# Patient Record
Sex: MALE | Race: WHITE | Employment: FULL TIME | URBAN - METROPOLITAN AREA
[De-identification: names, ages, dates, MRNs, and addresses within clinical notes are randomized per-mention and may not be internally consistent; named-entity substitution may affect disease eponyms.]

---

## 2018-02-13 ENCOUNTER — HOSPITAL ENCOUNTER (EMERGENCY)
Age: 38
Discharge: HOME | End: 2018-02-13
Attending: FAMILY MEDICINE

## 2018-02-13 ENCOUNTER — APPOINTMENT (OUTPATIENT)
Dept: GENERAL RADIOLOGY | Age: 38
End: 2018-02-13
Attending: FAMILY MEDICINE

## 2018-02-13 VITALS
SYSTOLIC BLOOD PRESSURE: 124 MMHG | RESPIRATION RATE: 16 BRPM | HEART RATE: 96 BPM | OXYGEN SATURATION: 98 % | DIASTOLIC BLOOD PRESSURE: 82 MMHG | HEIGHT: 72 IN | WEIGHT: 300 LBS | BODY MASS INDEX: 40.63 KG/M2 | TEMPERATURE: 98.4 F

## 2018-02-13 DIAGNOSIS — R68.89 FLU-LIKE SYMPTOMS: Primary | ICD-10-CM

## 2018-02-13 LAB
INFLUENZA A AG (POC): NORMAL
INFLUENZA AG (POC) NEGATIVE CTRL.: NORMAL
INFLUENZA AG (POC) POSITIVE CTRL.: NORMAL
INFLUENZA B AG (POC): NORMAL
LOT NUMBER POC: NORMAL

## 2018-02-13 RX ORDER — ALBUTEROL SULFATE 0.83 MG/ML
2.5 SOLUTION RESPIRATORY (INHALATION)
Status: COMPLETED | OUTPATIENT
Start: 2018-02-13 | End: 2018-02-13

## 2018-02-13 RX ORDER — BENZONATATE 200 MG/1
200 CAPSULE ORAL
Qty: 30 CAP | Refills: 0 | Status: SHIPPED | OUTPATIENT
Start: 2018-02-13 | End: 2018-06-23 | Stop reason: SDUPTHER

## 2018-02-13 RX ORDER — ALBUTEROL SULFATE 90 UG/1
2 AEROSOL, METERED RESPIRATORY (INHALATION)
Qty: 1 INHALER | Refills: 0 | Status: SHIPPED | OUTPATIENT
Start: 2018-02-13 | End: 2018-07-06 | Stop reason: SDUPTHER

## 2018-02-13 RX ORDER — OSELTAMIVIR PHOSPHATE 75 MG/1
75 CAPSULE ORAL 2 TIMES DAILY
Qty: 10 CAP | Refills: 0 | Status: SHIPPED | OUTPATIENT
Start: 2018-02-13 | End: 2018-02-18

## 2018-02-13 RX ADMIN — ALBUTEROL SULFATE 2.5 MG: 0.83 SOLUTION RESPIRATORY (INHALATION) at 17:14

## 2018-02-13 NOTE — DISCHARGE INSTRUCTIONS

## 2018-02-13 NOTE — LETTER
[facility] 
[address] [address] 
[phone] Work/School Note Date: 2/13/2018 To Whom It May concern: 
 
Vinh Diaz was seen and treated today in the urgent care center by the following provider(s): 
Attending Provider: Jerome Cedeño MD. Vinh Diaz may return to work on 2/19/2018. Sincerely, Jerome Cedeño MD

## 2018-02-13 NOTE — UC PROVIDER NOTE
Patient is a 40 y.o. male presenting with cold symptoms. The history is provided by the patient. Cold Symptoms    This is a new problem. The current episode started 2 days ago. The problem has not changed since onset. There has been a fever of 101 - 101.9 F. The fever has been present for less than 1 day. Associated symptoms include congestion, headaches, rhinorrhea, cough and wheezing. Pertinent negatives include no chest pain, no ear pain and no sore throat. Past Medical History:   Diagnosis Date    Depression         Past Surgical History:   Procedure Laterality Date    HX HEENT      Wisdon Teeth         Family History   Problem Relation Age of Onset    Asthma Mother     Migraines Mother     Hypertension Father     Gall Bladder Disease Brother         Social History     Social History    Marital status: UNKNOWN     Spouse name: N/A    Number of children: N/A    Years of education: N/A     Occupational History    Not on file. Social History Main Topics    Smoking status: Former Smoker    Smokeless tobacco: Never Used    Alcohol use Yes    Drug use: No    Sexual activity: Yes     Partners: Female     Other Topics Concern    Not on file     Social History Narrative                ALLERGIES: Mustard seed    Review of Systems   Constitutional: Positive for chills and fever. HENT: Positive for congestion and rhinorrhea. Negative for ear pain and sore throat. Respiratory: Positive for cough and wheezing. Negative for shortness of breath. Cardiovascular: Negative for chest pain and palpitations. Musculoskeletal: Positive for myalgias. Neurological: Positive for headaches. Vitals:    02/13/18 1624   BP: 124/82   Pulse: 96   Resp: 16   Temp: 98.4 °F (36.9 °C)   SpO2: 98%   Weight: 136.1 kg (300 lb)   Height: 6' (1.829 m)       Physical Exam   Constitutional: He appears well-developed and well-nourished. No distress.    HENT:   Right Ear: Tympanic membrane, external ear and ear canal normal.   Left Ear: Tympanic membrane, external ear and ear canal normal.   Nose: Mucosal edema and rhinorrhea present. Right sinus exhibits no maxillary sinus tenderness and no frontal sinus tenderness. Left sinus exhibits no maxillary sinus tenderness and no frontal sinus tenderness. Mouth/Throat: Mucous membranes are normal. Posterior oropharyngeal erythema present. No oropharyngeal exudate, posterior oropharyngeal edema or tonsillar abscesses. Cardiovascular: Normal rate, regular rhythm and normal heart sounds. Pulmonary/Chest: Effort normal. No respiratory distress. He has decreased breath sounds in the right lower field. He has wheezes in the right middle field and the left middle field. He has no rhonchi. He has no rales. Lymphadenopathy:     He has no cervical adenopathy. Neurological: He is alert. Skin: He is not diaphoretic. Psychiatric: He has a normal mood and affect. His behavior is normal. Judgment and thought content normal.   Nursing note and vitals reviewed. MDM     Differential Diagnosis; Clinical Impression; Plan:     CLINICAL IMPRESSION:  Flu-like symptoms  (primary encounter diagnosis)    Plan:  1. Tamiflu  2. Fluids  3. ER if worse  Amount and/or Complexity of Data Reviewed:   Clinical lab tests:  Ordered and reviewed  Tests in the radiology section of CPT®:  Ordered and reviewed  Risk of Significant Complications, Morbidity, and/or Mortality:   Presenting problems: Moderate  Diagnostic procedures: Moderate  Management options:   Moderate  Progress:   Patient progress:  Stable      Procedures

## 2018-02-15 ENCOUNTER — HOSPITAL ENCOUNTER (EMERGENCY)
Age: 38
Discharge: HOME | End: 2018-02-15
Attending: FAMILY MEDICINE

## 2018-02-15 VITALS
SYSTOLIC BLOOD PRESSURE: 128 MMHG | DIASTOLIC BLOOD PRESSURE: 85 MMHG | HEIGHT: 73 IN | WEIGHT: 293 LBS | RESPIRATION RATE: 16 BRPM | TEMPERATURE: 98.9 F | BODY MASS INDEX: 38.83 KG/M2 | OXYGEN SATURATION: 96 % | HEART RATE: 96 BPM

## 2018-02-15 DIAGNOSIS — J01.40 ACUTE NON-RECURRENT PANSINUSITIS: Primary | ICD-10-CM

## 2018-02-15 RX ORDER — DOXYCYCLINE 100 MG/1
100 CAPSULE ORAL 2 TIMES DAILY
Qty: 20 CAP | Refills: 0 | Status: SHIPPED | OUTPATIENT
Start: 2018-02-15 | End: 2018-02-25

## 2018-02-16 NOTE — UC PROVIDER NOTE
Patient is a 40 y.o. male presenting with sinus pain. The history is provided by the patient. Sinus Pain    This is a new problem. The current episode started yesterday. The problem has been gradually worsening. Patient reports a subjective fever - was not measured. The pain is at a severity of 4/10. The pain has been constant since onset. Associated symptoms include congestion, sinus pressure, swollen glands, cough and rhinorrhea. Pertinent negatives include no chills, no sweats, no sore throat, no shortness of breath and no chest pain. Treatments tried: On tamiflu for flu like symptoms. Past Medical History:   Diagnosis Date    Depression         Past Surgical History:   Procedure Laterality Date    HX HEENT      Wisdon Teeth         Family History   Problem Relation Age of Onset    Asthma Mother     Migraines Mother     Hypertension Father     Gall Bladder Disease Brother         Social History     Social History    Marital status: SINGLE     Spouse name: N/A    Number of children: N/A    Years of education: N/A     Occupational History    Not on file. Social History Main Topics    Smoking status: Former Smoker    Smokeless tobacco: Never Used    Alcohol use Yes    Drug use: No    Sexual activity: Yes     Partners: Female     Other Topics Concern    Not on file     Social History Narrative                ALLERGIES: Mustard seed    Review of Systems   Constitutional: Negative for chills and fever. HENT: Positive for congestion, rhinorrhea, sinus pain and sinus pressure. Negative for sore throat. Respiratory: Positive for cough. Negative for shortness of breath and wheezing. Cardiovascular: Negative for chest pain and palpitations. Vitals:    02/15/18 1905   BP: 128/85   Pulse: 96   Resp: 16   Temp: 98.9 °F (37.2 °C)   SpO2: 96%   Weight: 132.9 kg (293 lb)   Height: 6' 1\" (1.854 m)       Physical Exam   Constitutional: He appears well-developed and well-nourished.  No distress. HENT:   Right Ear: Tympanic membrane, external ear and ear canal normal.   Left Ear: Tympanic membrane, external ear and ear canal normal.   Nose: Rhinorrhea present. Right sinus exhibits maxillary sinus tenderness and frontal sinus tenderness. Left sinus exhibits maxillary sinus tenderness and frontal sinus tenderness. Mouth/Throat: Mucous membranes are normal. No oropharyngeal exudate, posterior oropharyngeal edema, posterior oropharyngeal erythema or tonsillar abscesses. Cardiovascular: Normal rate, regular rhythm and normal heart sounds. Pulmonary/Chest: Effort normal and breath sounds normal. No respiratory distress. He has no wheezes. He has no rales. Lymphadenopathy:     He has cervical adenopathy. Neurological: He is alert. Skin: He is not diaphoretic. Psychiatric: He has a normal mood and affect. His behavior is normal. Judgment and thought content normal.   Nursing note and vitals reviewed. MDM     Differential Diagnosis; Clinical Impression; Plan:     CLINICAL IMPRESSION:  Acute non-recurrent pansinusitis  (primary encounter diagnosis)    Plan:  1. doxycycline  2. PCP [facility]  Risk of Significant Complications, Morbidity, and/or Mortality:   Presenting problems: Moderate  Management options:   Moderate  Progress:   Patient progress:  Stable      Procedures

## 2018-02-16 NOTE — DISCHARGE INSTRUCTIONS
Sinusitis: Care Instructions  Your Care Instructions    Sinusitis is an infection of the lining of the sinus cavities in your head. Sinusitis often follows a cold. It causes pain and pressure in your head and face. In most cases, sinusitis gets better on its own in 1 to 2 weeks. But some mild symptoms may last for several weeks. Sometimes antibiotics are needed. Follow-up care is a key part of your treatment and safety. Be sure to make and go to all appointments, and call your doctor if you are having problems. It's also a good idea to know your test results and keep a list of the medicines you take. How can you care for yourself at home? · Take an over-the-counter pain medicine, such as acetaminophen (Tylenol), ibuprofen (Advil, Motrin), or naproxen (Aleve). Read and follow all instructions on the label. · If the doctor prescribed antibiotics, take them as directed. Do not stop taking them just because you feel better. You need to take the full course of antibiotics. · Be careful when taking over-the-counter cold or flu medicines and Tylenol at the same time. Many of these medicines have acetaminophen, which is Tylenol. Read the labels to make sure that you are not taking more than the recommended dose. Too much acetaminophen (Tylenol) can be harmful. · Breathe warm, moist air from a steamy shower, a hot bath, or a sink filled with hot water. Avoid cold, dry air. Using a humidifier in your home may help. Follow the directions for cleaning the machine. · Use saline (saltwater) nasal washes to help keep your nasal passages open and wash out mucus and bacteria. You can buy saline nose drops at a grocery store or drugstore. Or you can make your own at home by adding 1 teaspoon of salt and 1 teaspoon of baking soda to 2 cups of distilled water. If you make your own, fill a bulb syringe with the solution, insert the tip into your nostril, and squeeze gently. Arty Sine your nose.   · Put a hot, wet towel or a warm gel pack on your face 3 or 4 times a day for 5 to 10 minutes each time. · Try a decongestant nasal spray like oxymetazoline (Afrin). Do not use it for more than 3 days in a row. Using it for more than 3 days can make your congestion worse. When should you call for help? Call your doctor now or seek immediate medical care if:  ? · You have new or worse swelling or redness in your face or around your eyes. ? · You have a new or higher fever. ? Watch closely for changes in your health, and be sure to contact your doctor if:  ? · You have new or worse facial pain. ? · The mucus from your nose becomes thicker (like pus) or has new blood in it. ? · You are not getting better as expected. Where can you learn more? Go to http://lupe-liliam.info/. Enter R597 in the search box to learn more about \"Sinusitis: Care Instructions. \"  Current as of: May 12, 2017  Content Version: 11.4  © 3230-6473 Healthwise, Incorporated. Care instructions adapted under license by [employer] (which disclaims liability or warranty for this information). If you have questions about a medical condition or this instruction, always ask your healthcare professional. [address] any warranty or liability for your use of this information.

## 2018-06-11 ENCOUNTER — OFFICE VISIT (OUTPATIENT)
Dept: URGENT CARE | Age: 38
End: 2018-06-11

## 2018-06-11 VITALS
RESPIRATION RATE: 18 BRPM | WEIGHT: 304 LBS | HEART RATE: 94 BPM | DIASTOLIC BLOOD PRESSURE: 80 MMHG | SYSTOLIC BLOOD PRESSURE: 137 MMHG | TEMPERATURE: 98.3 F | OXYGEN SATURATION: 97 % | BODY MASS INDEX: 40.29 KG/M2 | HEIGHT: 73 IN

## 2018-06-11 DIAGNOSIS — J01.90 ACUTE SINUSITIS, RECURRENCE NOT SPECIFIED, UNSPECIFIED LOCATION: ICD-10-CM

## 2018-06-11 DIAGNOSIS — J20.9 ACUTE WHEEZY BRONCHITIS: Primary | ICD-10-CM

## 2018-06-11 PROBLEM — E66.01 OBESITY, MORBID (HCC): Status: ACTIVE | Noted: 2018-06-11

## 2018-06-11 RX ORDER — TRIAMCINOLONE ACETONIDE 55 UG/1
2 SPRAY, METERED NASAL DAILY
Qty: 1 BOTTLE | Refills: 0 | Status: SHIPPED | OUTPATIENT
Start: 2018-06-11 | End: 2018-11-24

## 2018-06-11 RX ORDER — DOXYCYCLINE 100 MG/1
100 CAPSULE ORAL 2 TIMES DAILY
Qty: 20 CAP | Refills: 0 | Status: SHIPPED | OUTPATIENT
Start: 2018-06-11 | End: 2018-06-21

## 2018-06-11 RX ORDER — PREDNISONE 20 MG/1
TABLET ORAL
Qty: 12 TAB | Refills: 0 | Status: SHIPPED | OUTPATIENT
Start: 2018-06-11 | End: 2018-07-06 | Stop reason: ALTCHOICE

## 2018-06-11 RX ORDER — ALBUTEROL SULFATE 90 UG/1
2 AEROSOL, METERED RESPIRATORY (INHALATION)
Qty: 1 INHALER | Refills: 0 | Status: SHIPPED | OUTPATIENT
Start: 2018-06-11 | End: 2018-07-06 | Stop reason: SDUPTHER

## 2018-06-11 NOTE — MR AVS SNAPSHOT
[address] [address] 
[phone] Patient: Jony Barnett MRN: [id number] [place] Visit Information Date & Time Provider Department Dept. Phone Encounter #  
 2018  8:45 AM [address] [facility] [phone] [id number] Upcoming Health Maintenance Date Due Influenza Age 5 to Adult 2018 DTaP/Tdap/Td series (2 - Td) 2022 Allergies as of 2018  Review Complete On: 2018 By: Tamiko Prater Severity Noted Reaction Type Reactions Mustard Seed  2014    Other (comments) Cotton mouth Current Immunizations  Never Reviewed Name Date TDAP Vaccine 2012 Not reviewed this visit You Were Diagnosed With   
  
 Codes Comments Acute wheezy bronchitis    -  Primary ICD-10-CM: J20.9 ICD-9-CM: 466.0 Acute sinusitis, recurrence not specified, unspecified location     ICD-10-CM: J01.90 ICD-9-CM: 461.9 Vitals BP Pulse Temp Resp Height(growth percentile) Weight(growth percentile) 137/80 94 98.3 °F (36.8 °C) 18 6' 1\" (1.854 m) 304 lb (137.9 kg) SpO2 BMI Smoking Status 97% 40.11 kg/m2 Former Smoker BMI and BSA Data Body Mass Index Body Surface Area  
 40.11 kg/m 2 2.67 m 2 Preferred Pharmacy Pharmacy Name Phone [id number] [place] [id number] [facility] [phone] Your Updated Medication List  
  
   
This list is accurate as of 18  9:10 AM.  Always use your most recent med list.  
  
  
  
  
 * albuterol 90 mcg/actuation inhaler Commonly known as:  PROVENTIL HFA, VENTOLIN HFA, PROAIR HFA Take 2 puffs by inhalation every six (6) hours as needed for Wheezing. * albuterol 90 mcg/actuation inhaler Commonly known as:  PROVENTIL HFA, VENTOLIN HFA, PROAIR HFA  
 Take 2 Puffs by inhalation every four (4) hours as needed for Wheezing. * albuterol 90 mcg/actuation inhaler Commonly known as:  PROVENTIL HFA, VENTOLIN HFA, PROAIR HFA Take 2 Puffs by inhalation every four (4) hours as needed for Wheezing. benzonatate 200 mg capsule Commonly known as:  TESSALON Take 1 Cap by mouth three (3) times daily as needed for Cough. doxycycline 100 mg capsule Commonly known as:  VIBRAMYCIN Take 1 Cap by mouth two (2) times a day for 10 days. predniSONE 20 mg tablet Commonly known as:  Alvie Craig Take 3 tabs by mouth one time daily for 2 days then take 2 tabs once daily for 3 days. Take with food. PRILOSEC PO Take  by mouth. * Notice: This list has 3 medication(s) that are the same as other medications prescribed for you. Read the directions carefully, and ask your doctor or other care provider to review them with you. Prescriptions Sent to Pharmacy Refills  
 predniSONE (DELTASONE) 20 mg tablet 0 Sig: Take 3 tabs by mouth one time daily for 2 days then take 2 tabs once daily for 3 days. Take with food. Class: Normal  
 Pharmacy: [id number] Ph #: [phone]  
 albuterol (PROVENTIL HFA, VENTOLIN HFA, PROAIR HFA) 90 mcg/actuation inhaler 0 Sig: Take 2 Puffs by inhalation every four (4) hours as needed for Wheezing. Class: Normal  
 Pharmacy: [facility] #[id number]- [address] Ph #: [phone] Route: Inhalation  
 doxycycline (VIBRAMYCIN) 100 mg capsule 0 Sig: Take 1 Cap by mouth two (2) times a day for 10 days. Class: Normal  
 Pharmacy: [facility]/pharmacy #[id number]- [address] Ph #: [phone] Route: Oral  
  
Patient Instructions Follow up with PCP without improvement over next 4-5 days sooner/immediately for new or worsening Will consider chest x ray without improvement. Bronchitis: Care Instructions Your Care Instructions Bronchitis is inflammation of the bronchial tubes, which carry air to the lungs. The tubes swell and produce mucus, or phlegm. The mucus and inflamed bronchial tubes make you cough. You may have trouble breathing. Most cases of bronchitis are caused by viruses like those that cause colds. Antibiotics usually do not help and they may be harmful. Bronchitis usually develops rapidly and lasts about 2 to 3 weeks in otherwise healthy people. Follow-up care is a key part of your treatment and safety. Be sure to make and go to all appointments, and call your doctor if you are having problems. It's also a good idea to know your test results and keep a list of the medicines you take. How can you care for yourself at home? · Take all medicines exactly as prescribed. Call your doctor if you think you are having a problem with your medicine. · Get some extra rest. 
· Take an over-the-counter pain medicine, such as acetaminophen (Tylenol), ibuprofen (Advil, Motrin), or naproxen (Aleve) to reduce fever and relieve body aches. Read and follow all instructions on the label. · Do not take two or more pain medicines at the same time unless the doctor told you to. Many pain medicines have acetaminophen, which is Tylenol. Too much acetaminophen (Tylenol) can be harmful. · Take an over-the-counter cough medicine that contains dextromethorphan to help quiet a dry, hacking cough so that you can sleep. Avoid cough medicines that have more than one active ingredient. Read and follow all instructions on the label. · Breathe moist air from a humidifier, hot shower, or sink filled with hot water. The heat and moisture will thin mucus so you can cough it out. · Do not smoke. Smoking can make bronchitis worse.  If you need help quitting, talk to your doctor about stop-smoking programs and medicines. These can increase your chances of quitting for good. When should you call for help? Call 911 anytime you think you may need emergency care. For example, call if: 
? · You have severe trouble breathing. ?Call your doctor now or seek immediate medical care if: 
? · You have new or worse trouble breathing. ? · You cough up dark brown or bloody mucus (sputum). ? · You have a new or higher fever. ? · You have a new rash. ? Watch closely for changes in your health, and be sure to contact your doctor if: 
? · You cough more deeply or more often, especially if you notice more mucus or a change in the color of your mucus. ? · You are not getting better as expected. Where can you learn more? Go to http://lupe-liliam.info/. Enter H333 in the search box to learn more about \"Bronchitis: Care Instructions. \" Current as of: May 12, 2017 Content Version: 11.4 © 5551-6381 Healthwise, Incorporated. Care instructions adapted under license by [employer] (which disclaims liability or warranty for this information). If you have questions about a medical condition or this instruction, always ask your healthcare professional. [place] [address] any warranty or liability for your use of this information. Introducing [facility] & HE[facility]! [employer] introduces Bangbite patient portal. Now you can access parts of your medical record, email your doctor's office, and request medication refills online. 1. In your internet browser, go to [email or web address] 2. Click on the First Time User? Click Here link in the Sign In box. You will see the New Member Sign Up page. 3. Enter your Bangbite Access Code exactly as it appears below. You will not need to use this code after youve completed the sign-up process.  If you do not sign up before the expiration date, you must request a new code. 
 
· Muse Access Code: [email or web address] Expires: 9/9/2018  8:43 AM 
 
4. Enter the last four digits of your Social Security Number (xxxx) and Date of Birth (mm/dd/yyyy) as indicated and click Submit. You will be taken to the next sign-up page. 5. Create a Muse ID. This will be your Muse login ID and cannot be changed, so think of one that is secure and easy to remember. 6. Create a Muse password. You can change your password at any time. 7. Enter your Password Reset Question and Answer. This can be used at a later time if you forget your password. 8. Enter your e-mail address. You will receive e-mail notification when new information is available in [address] 9. Click Sign Up. You can now view and download portions of your medical record. 10. Click the Download Summary menu link to download a portable copy of your medical information. If you have questions, please visit the Frequently Asked Questions section of the Muse website. Remember, Muse is NOT to be used for urgent needs. For medical emergencies, dial 911. Now available from your iPhone and Android! Please provide this summary of care documentation to your next provider. Your primary care clinician is listed as Bao Burciaga. If you have any questions after today's visit, please call [phone].

## 2018-06-11 NOTE — LETTER
NOTIFICATION RETURN TO WORK / SCHOOL 
 
6/11/2018 9:09 AM 
 
Mr. Traci Aponte [address] [address] To Whom It May Concern: 
 
Michel Martins is currently under the care of [address] [facility] [address]. He will return to work/school on: 06/12 or 06/13/2018 If there are questions or concerns please have the patient contact our office. Sincerely, [facility] PROVIDER Lesley Pierce NP

## 2018-06-11 NOTE — PATIENT INSTRUCTIONS
Follow up with PCP without improvement over next 4-5 days sooner/immediately for new or worsening  Will consider chest x ray without improvement. Bronchitis: Care Instructions  Your Care Instructions    Bronchitis is inflammation of the bronchial tubes, which carry air to the lungs. The tubes swell and produce mucus, or phlegm. The mucus and inflamed bronchial tubes make you cough. You may have trouble breathing. Most cases of bronchitis are caused by viruses like those that cause colds. Antibiotics usually do not help and they may be harmful. Bronchitis usually develops rapidly and lasts about 2 to 3 weeks in otherwise healthy people. Follow-up care is a key part of your treatment and safety. Be sure to make and go to all appointments, and call your doctor if you are having problems. It's also a good idea to know your test results and keep a list of the medicines you take. How can you care for yourself at home? · Take all medicines exactly as prescribed. Call your doctor if you think you are having a problem with your medicine. · Get some extra rest.  · Take an over-the-counter pain medicine, such as acetaminophen (Tylenol), ibuprofen (Advil, Motrin), or naproxen (Aleve) to reduce fever and relieve body aches. Read and follow all instructions on the label. · Do not take two or more pain medicines at the same time unless the doctor told you to. Many pain medicines have acetaminophen, which is Tylenol. Too much acetaminophen (Tylenol) can be harmful. · Take an over-the-counter cough medicine that contains dextromethorphan to help quiet a dry, hacking cough so that you can sleep. Avoid cough medicines that have more than one active ingredient. Read and follow all instructions on the label. · Breathe moist air from a humidifier, hot shower, or sink filled with hot water. The heat and moisture will thin mucus so you can cough it out. · Do not smoke. Smoking can make bronchitis worse.  If you need help quitting, talk to your doctor about stop-smoking programs and medicines. These can increase your chances of quitting for good. When should you call for help? Call 911 anytime you think you may need emergency care. For example, call if:  ? · You have severe trouble breathing. ?Call your doctor now or seek immediate medical care if:  ? · You have new or worse trouble breathing. ? · You cough up dark brown or bloody mucus (sputum). ? · You have a new or higher fever. ? · You have a new rash. ? Watch closely for changes in your health, and be sure to contact your doctor if:  ? · You cough more deeply or more often, especially if you notice more mucus or a change in the color of your mucus. ? · You are not getting better as expected. Where can you learn more? Go to http://lupe-liliam.info/. Enter H333 in the search box to learn more about \"Bronchitis: Care Instructions. \"  Current as of: May 12, 2017  Content Version: 11.4  © 4821-7109 Healthwise, Incorporated. Care instructions adapted under license by [employer] (which disclaims liability or warranty for this information). If you have questions about a medical condition or this instruction, always ask your healthcare professional. [address] any warranty or liability for your use of this information.

## 2018-06-11 NOTE — PROGRESS NOTES
HPI Comments:   Here for tight dry cough x 4-5 days. Some loose in morning. Symptoms intermittent. Some night awakening. Associated nasal congestion. Did albuterol nebs at home with some relief but then symptoms returned. Denies smoking or hx of asthma. Works around [employer] environment and occasionally has wheezy flare ups. Does have history of pneumonia post influenza this past winter. Some chills. No measured fever. Overall not improving. No known sick contacts. Patient is a 40 y.o. male presenting with cold symptoms. Cold Symptoms   Associated symptoms include wheezing. Pertinent negatives include no chest pain, no headaches, no nausea and no vomiting. Past Medical History:   Diagnosis Date    Depression         Past Surgical History:   Procedure Laterality Date    HX HEENT      Wisd[place] Teeth         Family History   Problem Relation Age of Onset    Asthma Mother     Migraines Mother     Hypertension Father     Gall Bladder Disease Brother         Social History     Social History    Marital status: SINGLE     Spouse name: N/A    Number of children: N/A    Years of education: N/A     Occupational History    Not on file. Social History Main Topics    Smoking status: Former Smoker    Smokeless tobacco: Never Used    Alcohol use Yes    Drug use: No    Sexual activity: Yes     Partners: Female     Other Topics Concern    Not on file     Social History Narrative                ALLERGIES: Mustard seed    Review of Systems   Constitutional: Negative for fatigue and fever. Respiratory: Positive for cough, chest tightness and wheezing. Cardiovascular: Negative for chest pain, palpitations and leg swelling. Gastrointestinal: Negative for nausea and vomiting. Neurological: Negative for dizziness, weakness and headaches. All other systems reviewed and are negative.       Vitals:    06/11/18 0851   BP: 137/80   Pulse: 94   Resp: 18   Temp: 98.3 °F (36.8 °C)   SpO2: 97% Weight: 304 lb (137.9 kg)   Height: 6' 1\" (1.854 m)       Physical Exam   Constitutional: He is oriented to person, place, and time. No distress. Non-toxic appearing, well hydrated   HENT:   Swollen nasal turbinates bilat. OP with post nasal drip. No swelling or exudate. Mucus membranes moist.   Eyes: Conjunctivae and EOM are normal. Pupils are equal, round, and reactive to light. No scleral icterus. Neck: Normal range of motion. Neck supple. Cardiovascular: Normal rate, regular rhythm and normal heart sounds. Exam reveals no gallop and no friction rub. No murmur heard. Pulmonary/Chest: Effort normal. No respiratory distress. He has no rales. Scattered wheeze throughout lung fields. Otherwise good air movent. Musculoskeletal:   No LE edema   Lymphadenopathy:     He has no cervical adenopathy. Neurological: He is alert and oriented to person, place, and time. No cranial nerve deficit. Skin: Skin is warm and dry. No rash noted. He is not diaphoretic. No erythema. No pallor. Psychiatric: He has a normal mood and affect. His behavior is normal. Judgment and thought content normal.   Nursing note and vitals reviewed. MDM     Differential Diagnosis; Clinical Impression; Plan:       CLINICAL IMPRESSION:  (J20.9) Acute wheezy bronchitis  (primary encounter diagnosis)  (J01.90) Acute sinusitis, recurrence not specified, unspecified location    Orders Placed This Encounter      predniSONE (DELTASONE) 20 mg tablet      albuterol (PROVENTIL HFA, VENTOLIN HFA, PROAIR HFA) 90 mcg/actuation inhaler       doxycycline (VIBRAMYCIN) 100 mg capsule      triamcinolone (NASACORT AQ) 55 mcg nasal inhaler      Plan:  1. See above orders  2. Maintain adequate fluid intake  3. Wear protective mask while at work to keep dust/irritant out  4. Review handouts  5. Consider CXR for worsening or non improvement; will hold off today. VS stable, afebrile.     We have reviewed concerning signs/symptoms, normal vs abnormal progression of medical condition and when to seek immediate medical attention. Schedule with PCP or Urgent Care immediately for worsening or new symptoms. See your PCP or return to urgent care if there is not at least some improvement in symptoms within the next 4-5 days. You should see your PCP for updates on your routine health maintenance.        Risk of Significant Complications, Morbidity, and/or Mortality:   Presenting problems:  Low  Diagnostic procedures:  Low  Management options:  Low  Progress:   Patient progress:  Stable      Procedures

## 2018-06-23 ENCOUNTER — OFFICE VISIT (OUTPATIENT)
Dept: URGENT CARE | Age: 38
End: 2018-06-23

## 2018-06-23 VITALS
WEIGHT: 306 LBS | HEART RATE: 91 BPM | TEMPERATURE: 98 F | SYSTOLIC BLOOD PRESSURE: 142 MMHG | BODY MASS INDEX: 39.27 KG/M2 | DIASTOLIC BLOOD PRESSURE: 78 MMHG | HEIGHT: 74 IN | RESPIRATION RATE: 18 BRPM | OXYGEN SATURATION: 97 %

## 2018-06-23 DIAGNOSIS — J45.41 MODERATE PERSISTENT ASTHMATIC BRONCHITIS WITH ACUTE EXACERBATION: Primary | ICD-10-CM

## 2018-06-23 RX ORDER — IBUPROFEN 800 MG/1
800 TABLET ORAL
Qty: 20 TAB | Refills: 0 | Status: SHIPPED | OUTPATIENT
Start: 2018-06-23 | End: 2018-11-24

## 2018-06-23 RX ORDER — BENZONATATE 200 MG/1
200 CAPSULE ORAL
Qty: 30 CAP | Refills: 0 | Status: SHIPPED | OUTPATIENT
Start: 2018-06-23 | End: 2018-07-06 | Stop reason: ALTCHOICE

## 2018-06-23 NOTE — MR AVS SNAPSHOT
[address] [facility] [place] [address] 
[phone] Patient: Alley Cox MRN: [id number] CJN:[id number] Visit Information Date & Time Provider Department Dept. Phone Encounter #  
 6/23/2018 11:45 AM [facility] [phone] [id number] Follow-up Instructions Return if symptoms worsen or fail to improve. Your Appointments 7/6/2018  2:30 PM  
PHYSICAL PRE OP with Veda Youssef III, DO [facility] [address] [facility]) Appt Note: NP est PCP, CPE fl 06/13/18  
 [address] [facility] [address] [id number]  
[address] Upcoming Health Maintenance Date Due Influenza Age 5 to Adult 8/1/2018 DTaP/Tdap/Td series (2 - Td) 4/26/2022 Allergies as of 6/23/2018  Review Complete On: 6/23/2018 By: Valdo Lawler Severity Noted Reaction Type Reactions Mustard Seed  08/01/2014    Other (comments) Cotton mouth Current Immunizations  Never Reviewed Name Date TDAP Vaccine 4/26/2012 Not reviewed this visit You Were Diagnosed With   
  
 Codes Comments Moderate persistent asthmatic bronchitis with acute exacerbation    -  Primary ICD-10-CM: J45.41 
ICD-9-CM: 493.92 Vitals BP Pulse Temp Resp Height(growth percentile) Weight(growth percentile) 142/78 91 98 °F (36.7 °C) 18 6' 2\" (1.88 m) 306 lb (138.8 kg) SpO2 BMI Smoking Status 97% 39.29 kg/m2 Former Smoker BMI and BSA Data Body Mass Index Body Surface Area  
 39.29 kg/m 2 2.69 m 2 Preferred Pharmacy Pharmacy Name Phone [id number] [address] [facility] [phone] Your Updated Medication List  
  
   
This list is accurate as of 6/23/18 12:18 PM.  Always use your most recent med list.  
  
  
  
  
 * albuterol 90 mcg/actuation inhaler Commonly known as:  PROVENTIL HFA, VENTOLIN HFA, PROAIR HFA Take 2 puffs by inhalation every six (6) hours as needed for Wheezing. * albuterol 90 mcg/actuation inhaler Commonly known as:  PROVENTIL HFA, VENTOLIN HFA, PROAIR HFA Take 2 Puffs by inhalation every four (4) hours as needed for Wheezing. * albuterol 90 mcg/actuation inhaler Commonly known as:  PROVENTIL HFA, VENTOLIN HFA, PROAIR HFA Take 2 Puffs by inhalation every four (4) hours as needed for Wheezing. benzonatate 200 mg capsule Commonly known as:  TESSALON Take 1 Cap by mouth three (3) times daily as needed for Cough. ibuprofen 800 mg tablet Commonly known as:  MOTRIN Take 1 Tab by mouth every eight (8) hours as needed for Pain. predniSONE 20 mg tablet Commonly known as:  Gee Baudilio Take 3 tabs by mouth one time daily for 2 days then take 2 tabs once daily for 3 days. Take with food. PRILOSEC PO Take  by mouth.  
  
 triamcinolone 55 mcg nasal inhaler Commonly known as:  NASACORT AQ  
2 Sprays by Both Nostrils route daily. * Notice: This list has 3 medication(s) that are the same as other medications prescribed for you. Read the directions carefully, and ask your doctor or other care provider to review them with you. Prescriptions Sent to Pharmacy Refills  
 benzonatate (TESSALON) 200 mg capsule 0 Sig: Take 1 Cap by mouth three (3) times daily as needed for Cough. Class: Normal  
 Pharmacy: [id number]- [address] Ph #: [phone] Route: Oral  
 ibuprofen (MOTRIN) 800 mg tablet 0 Sig: Take 1 Tab by mouth every eight (8) hours as needed for Pain. Class: Normal  
 Pharmacy: [facility]/pharmacy #[id number]- [address] Ph #: [phone] Route: Oral  
  
Follow-up Instructions Return if symptoms worsen or fail to improve. Patient Instructions Bronchitis: Care Instructions Your Care Instructions Bronchitis is inflammation of the bronchial tubes, which carry air to the lungs. The tubes swell and produce mucus, or phlegm. The mucus and inflamed bronchial tubes make you cough. You may have trouble breathing. Most cases of bronchitis are caused by viruses like those that cause colds. Antibiotics usually do not help and they may be harmful. Bronchitis usually develops rapidly and lasts about 2 to 3 weeks in otherwise healthy people. Follow-up care is a key part of your treatment and safety. Be sure to make and go to all appointments, and call your doctor if you are having problems. It's also a good idea to know your test results and keep a list of the medicines you take. How can you care for yourself at home? · Take all medicines exactly as prescribed. Call your doctor if you think you are having a problem with your medicine. · Get some extra rest. 
· Take an over-the-counter pain medicine, such as acetaminophen (Tylenol), ibuprofen (Advil, Motrin), or naproxen (Aleve) to reduce fever and relieve body aches. Read and follow all instructions on the label. · Do not take two or more pain medicines at the same time unless the doctor told you to. Many pain medicines have acetaminophen, which is Tylenol. Too much acetaminophen (Tylenol) can be harmful. · Take an over-the-counter cough medicine that contains dextromethorphan to help quiet a dry, hacking cough so that you can sleep. Avoid cough medicines that have more than one active ingredient. Read and follow all instructions on the label. · Breathe moist air from a humidifier, hot shower, or sink filled with hot water. The heat and moisture will thin mucus so you can cough it out. · Do not smoke. Smoking can make bronchitis worse.  If you need help quitting, talk to your doctor about stop-smoking programs and medicines. These can increase your chances of quitting for good. When should you call for help? Call 911 anytime you think you may need emergency care. For example, call if: 
? · You have severe trouble breathing. ?Call your doctor now or seek immediate medical care if: 
? · You have new or worse trouble breathing. ? · You cough up dark brown or bloody mucus (sputum). ? · You have a new or higher fever. ? · You have a new rash. ? Watch closely for changes in your health, and be sure to contact your doctor if: 
? · You cough more deeply or more often, especially if you notice more mucus or a change in the color of your mucus. ? · You are not getting better as expected. Where can you learn more? Go to http://lupe-liliam.info/. Enter H333 in the search box to learn more about \"Bronchitis: Care Instructions. \" Current as of: May 12, 2017 Content Version: 11.4 © 5175-9829 [employer]. Care instructions adapted under license by [employer] (which disclaims liability or warranty for this information). If you have questions about a medical condition or this instruction, always ask your healthcare professional. [address] any warranty or liability for your use of this information. Introducing [facility]! Aden Sampson introduces NextDigest patient portal. Now you can access parts of your medical record, email your doctor's office, and request medication refills online. 1. In your internet browser, go to [email or web address] 2. Click on the First Time User? Click Here link in the Sign In box. You will see the New Member Sign Up page. 3. Enter your NextDigest Access Code exactly as it appears below. You will not need to use this code after youve completed the sign-up process.  If you do not sign up before the expiration date, you must request a new code. 
 
· Outlisten Access Code: [email or web address] Expires: 9/9/2018  8:43 AM 
 
4. Enter the last four digits of your Social Security Number (xxxx) and Date of Birth (mm/dd/yyyy) as indicated and click Submit. You will be taken to the next sign-up page. 5. Create a Outlisten ID. This will be your Outlisten login ID and cannot be changed, so think of one that is secure and easy to remember. 6. Create a Outlisten password. You can change your password at any time. 7. Enter your Password Reset Question and Answer. This can be used at a later time if you forget your password. 8. Enter your e-mail address. You will receive e-mail notification when new information is available in [address] 9. Click Sign Up. You can now view and download portions of your medical record. 10. Click the Download Summary menu link to download a portable copy of your medical information. If you have questions, please visit the Frequently Asked Questions section of the Outlisten website. Remember, Outlisten is NOT to be used for urgent needs. For medical emergencies, dial 911. Now available from your iPhone and Android! Please provide this summary of care documentation to your next provider. Your primary care clinician is listed as Jennyfer Wilkins. If you have any questions after today's visit, please call [phone].

## 2018-06-23 NOTE — PROGRESS NOTES
HISTORY OF PRESENT ILLNESS  Renata Arias is a 40 y.o. male. Cough   The history is provided by the patient. This is a recurrent (seen and treated here on 6/11 for asthmatic bronchitis and sinusitis) problem. The problem has been gradually worsening (for past few days, felt better last week when had tooth pulled). Associated symptoms include chest pain (tightness) and shortness of breath. Pertinent negatives include no headaches. Exacerbated by: lying flat at night. Nothing relieves the symptoms. Treatments tried: inhaler used 3 times daily. The treatment provided mild relief. Review of Systems   Constitutional: Negative for chills and fever. HENT: Negative for congestion, ear pain and sore throat. Respiratory: Positive for cough, shortness of breath and wheezing. Negative for hemoptysis and sputum production. Cardiovascular: Positive for chest pain (tightness). Negative for leg swelling. Musculoskeletal: Negative for myalgias. Neurological: Negative for dizziness and headaches. Physical Exam   Constitutional: He is oriented to person, place, and time. He appears well-developed and well-nourished. He appears distressed. HENT:   Head: Normocephalic and atraumatic. Right Ear: External ear and ear canal normal. Tympanic membrane is injected. Tympanic membrane is not erythematous. Left Ear: External ear and ear canal normal. Tympanic membrane is injected. Tympanic membrane is not erythematous. Nose: Mucosal edema (pale and boggy) present. No rhinorrhea. Right sinus exhibits no maxillary sinus tenderness and no frontal sinus tenderness. Left sinus exhibits no maxillary sinus tenderness and no frontal sinus tenderness. Mouth/Throat: Oropharynx is clear and moist and mucous membranes are normal. No oropharyngeal exudate, posterior oropharyngeal edema or posterior oropharyngeal erythema. Eyes: Pupils are equal, round, and reactive to light. Neck: Normal range of motion. Neck supple. No thyromegaly present. Cardiovascular: Normal rate, regular rhythm, normal heart sounds and intact distal pulses. Exam reveals no gallop and no friction rub. No murmur heard. Pulmonary/Chest: Effort normal and breath sounds normal. No respiratory distress. He has no wheezes. He has no rales. Abdominal: Soft. Bowel sounds are normal. He exhibits no distension and no mass. There is no tenderness. There is no rebound. Musculoskeletal: Normal range of motion. He exhibits no edema or tenderness. Lymphadenopathy:     He has no cervical adenopathy. Neurological: He is alert and oriented to person, place, and time. Skin: Skin is warm and dry. No rash noted. He is not diaphoretic. No erythema. Psychiatric: He has a normal mood and affect. His behavior is normal. Judgment and thought content normal.   Nursing note and vitals reviewed. ASSESSMENT and PLAN  CLINICAL IMPRESSION: asthmatic bronchitis    Plan: 1. Tessalon perles  2.  mg  3. Keep appt with PCP on 7/6, may need ICS    The patients condition was discussed with the patient and they understand. The patient is to follow up with PCP. If signs and symptoms become worse the pt is to go to the ER. The patient is to take medications as prescribed. ICD-10-CM ICD-9-CM    1.  Moderate persistent asthmatic bronchitis with acute exacerbation J45.41 493.92 benzonatate (TESSALON) 200 mg capsule      ibuprofen (MOTRIN) 800 mg tablet

## 2018-06-23 NOTE — PATIENT INSTRUCTIONS
Bronchitis: Care Instructions  Your Care Instructions    Bronchitis is inflammation of the bronchial tubes, which carry air to the lungs. The tubes swell and produce mucus, or phlegm. The mucus and inflamed bronchial tubes make you cough. You may have trouble breathing. Most cases of bronchitis are caused by viruses like those that cause colds. Antibiotics usually do not help and they may be harmful. Bronchitis usually develops rapidly and lasts about 2 to 3 weeks in otherwise healthy people. Follow-up care is a key part of your treatment and safety. Be sure to make and go to all appointments, and call your doctor if you are having problems. It's also a good idea to know your test results and keep a list of the medicines you take. How can you care for yourself at home? · Take all medicines exactly as prescribed. Call your doctor if you think you are having a problem with your medicine. · Get some extra rest.  · Take an over-the-counter pain medicine, such as acetaminophen (Tylenol), ibuprofen (Advil, Motrin), or naproxen (Aleve) to reduce fever and relieve body aches. Read and follow all instructions on the label. · Do not take two or more pain medicines at the same time unless the doctor told you to. Many pain medicines have acetaminophen, which is Tylenol. Too much acetaminophen (Tylenol) can be harmful. · Take an over-the-counter cough medicine that contains dextromethorphan to help quiet a dry, hacking cough so that you can sleep. Avoid cough medicines that have more than one active ingredient. Read and follow all instructions on the label. · Breathe moist air from a humidifier, hot shower, or sink filled with hot water. The heat and moisture will thin mucus so you can cough it out. · Do not smoke. Smoking can make bronchitis worse. If you need help quitting, talk to your doctor about stop-smoking programs and medicines. These can increase your chances of quitting for good.   When should you call for help? Call 911 anytime you think you may need emergency care. For example, call if:  ? · You have severe trouble breathing. ?Call your doctor now or seek immediate medical care if:  ? · You have new or worse trouble breathing. ? · You cough up dark brown or bloody mucus (sputum). ? · You have a new or higher fever. ? · You have a new rash. ? Watch closely for changes in your health, and be sure to contact your doctor if:  ? · You cough more deeply or more often, especially if you notice more mucus or a change in the color of your mucus. ? · You are not getting better as expected. Where can you learn more? Go to http://lupe-liliam.info/. Enter H333 in the search box to learn more about \"Bronchitis: Care Instructions. \"  Current as of: May 12, 2017  Content Version: 11.4  © 2049-8350 [employer]. Care instructions adapted under license by [employer] (which disclaims liability or warranty for this information). If you have questions about a medical condition or this instruction, always ask your healthcare professional. [address] any warranty or liability for your use of this information.

## 2018-07-06 ENCOUNTER — OFFICE VISIT (OUTPATIENT)
Dept: INTERNAL MEDICINE CLINIC | Age: 38
End: 2018-07-06

## 2018-07-06 VITALS
HEART RATE: 85 BPM | HEIGHT: 74 IN | BODY MASS INDEX: 39.25 KG/M2 | WEIGHT: 305.8 LBS | SYSTOLIC BLOOD PRESSURE: 127 MMHG | DIASTOLIC BLOOD PRESSURE: 85 MMHG | OXYGEN SATURATION: 96 % | TEMPERATURE: 98.4 F | RESPIRATION RATE: 18 BRPM

## 2018-07-06 DIAGNOSIS — Z00.00 ROUTINE ADULT HEALTH MAINTENANCE: Primary | ICD-10-CM

## 2018-07-06 DIAGNOSIS — G47.33 OSA (OBSTRUCTIVE SLEEP APNEA): ICD-10-CM

## 2018-07-06 DIAGNOSIS — B37.49 CANDIDIASIS OF SCROTUM: ICD-10-CM

## 2018-07-06 RX ORDER — NYSTATIN 100000 [USP'U]/G
POWDER TOPICAL 4 TIMES DAILY
Qty: 60 G | Refills: 1 | Status: SHIPPED | OUTPATIENT
Start: 2018-07-06 | End: 2018-09-05 | Stop reason: SDUPTHER

## 2018-07-06 NOTE — MR AVS SNAPSHOT
[address] 
[phone] Patient: Edin He MRN: [id number] KFB:[id number] Visit Information Date & Time Provider Department Dept. Phone Encounter #  
 7/6/2018  2:30 PM Brigitte Fariha, [address] [place] [address] [phone] Follow-up Instructions Return in about 1 year (around 7/6/2019). Upcoming Health Maintenance Date Due Pneumococcal 19-64 Medium Risk (1 of 1 - PPSV23) 9/15/1999 Influenza Age 5 to Adult 8/1/2018 DTaP/Tdap/Td series (2 - Td) 4/26/2022 Allergies as of 7/6/2018  Review Complete On: 7/6/2018 By: Bettie Palma III,  Severity Noted Reaction Type Reactions Mustard Seed  08/01/2014    Other (comments) Cotton mouth Current Immunizations  Never Reviewed Name Date TDAP Vaccine 4/26/2012 Not reviewed this visit You Were Diagnosed With   
  
 Codes Comments Routine adult health maintenance    -  Primary ICD-10-CM: Z00.00 ICD-9-CM: V70.0   
 DIXON (obstructive sleep apnea)     ICD-10-CM: G47.33 
ICD-9-CM: 327.23 Candidiasis of scrotum     ICD-10-CM: B37.49 
ICD-9-CM: 112.2 Vitals BP Pulse Temp Resp Height(growth percentile) Weight(growth percentile) 127/85 (BP 1 Location: Left arm, BP Patient Position: Sitting) 85 98.4 °F (36.9 °C) (Oral) 18 6' 2\" (1.88 m) 305 lb 12.8 oz (138.7 kg) SpO2 BMI Smoking Status 96% 39.26 kg/m2 Former Smoker Vitals History BMI and BSA Data Body Mass Index Body Surface Area  
 39.26 kg/m 2 2.69 m 2 Preferred Pharmacy Pharmacy Name Phone [id number] [place] [address] [facility] [phone] Your Updated Medication List  
  
   
This list is accurate as of 7/6/18  3:34 PM.  Always use your most recent med list.  
  
  
  
  
 albuterol 90 mcg/actuation inhaler Commonly known as:  PROVENTIL HFA, VENTOLIN HFA, PROAIR HFA Take 2 puffs by inhalation every six (6) hours as needed for Wheezing. ibuprofen 800 mg tablet Commonly known as:  MOTRIN Take 1 Tab by mouth every eight (8) hours as needed for Pain. LUBRICATING RELIEF 0.4-0.3 % Drop Generic drug:  peg 400-propylene glycol  
as needed. nystatin powder Commonly known as:  MYCOSTATIN Apply  to affected area four (4) times daily. PRILOSEC PO Take  by mouth.  
  
 triamcinolone 55 mcg nasal inhaler Commonly known as:  NASACORT AQ  
2 Sprays by Both Nostrils route daily. Prescriptions Sent to Pharmacy Refills  
 nystatin (MYCOSTATIN) powder 1 Sig: Apply  to affected area four (4) times daily. Class: Normal  
 Pharmacy: [id number]- [address]  #: [phone] Route: Topical  
  
We Performed the Following CBC WITH AUTOMATED DIFF [83602 CPT(R)] HEMOGLOBIN A1C WITH EAG [61793 CPT(R)] HIV 1/2 AG/AB, 4TH GENERATION,W RFLX CONFIRM [id number] CPT(R)] LIPID PANEL [33614 CPT(R)] METABOLIC PANEL, COMPREHENSIVE [63792 CPT(R)] TSH 3RD GENERATION [01721 CPT(R)] Follow-up Instructions Return in about 1 year (around 7/6/2019). Patient Instructions Candidiasis: Care Instructions Your Care Instructions Candidiasis (say \"qfw-vkg-WX-uh-astrid\") is a yeast infection. Yeast normally lives in your body. But it can cause problems if your body's defenses don't work as they should. Some medicines can increase your chance of getting a yeast infection. These include antibiotics, steroids, and cancer drugs. And some diseases like AIDS and diabetes can make you more likely to get yeast infections. There are different types of yeast infections. Fuentes Samuel is a yeast infection in the mouth.  It usually occurs in people with weak immune systems. It causes white patches inside the mouth and throat. Yeast infections of the skin usually occur in skin folds where the skin stays moist. They cause red, oozing patches on your skin. Babies can get these infections under the diaper. People who often wear gloves can get them on their hands. Many women get vaginal yeast infections. They are most common when women take antibiotics. These infections can cause the vagina to itch and burn. They also cause white discharge that looks like cottage cheese. In rare cases, yeast infects the blood. This can cause serious disease. This kind of infection is treated with medicine given through a needle into a vein (IV). After you start treatment, a yeast infection usually goes away quickly. But if your immune system is weak, the infection may come back. Tell your doctor if you get yeast infections often. Follow-up care is a key part of your treatment and safety. Be sure to make and go to all appointments, and call your doctor if you are having problems. It's also a good idea to know your test results and keep a list of the medicines you take. How can you care for yourself at home? · Take your medicines exactly as prescribed. Call your doctor if you think you are having a problem with your medicine. · Use antibiotics only as directed by your doctor. · Eat yogurt with live cultures. It has bacteria called lactobacillus. It may help prevent some types of yeast infections. · Keep your skin clean and dry. Put powder on moist places. · If you are using a cream or suppository to treat a vaginal yeast infection, don't use condoms or a diaphragm. Use a different type of birth control. · Eat a healthy diet and get regular exercise. This will help keep your immune system strong. When should you call for help? Watch closely for changes in your health, and be sure to contact your doctor if: 
? · You do not get better as expected. Where can you learn more? Go to http://lupe-liliam.info/. Enter C661 in the search box to learn more about \"Candidiasis: Care Instructions. \" Current as of: October 13, 2016 Content Version: 11.4 © 7424-4953 [employer]. Care instructions adapted under license by [employer] (which disclaims liability or warranty for this information). If you have questions about a medical condition or this instruction, always ask your healthcare professional. [address] any warranty or liability for your use of this information. Learning About CPAP for Sleep Apnea What is CPAP? CPAP is a small machine that you use at home every night while you sleep. It increases air pressure in your throat to keep your airway open. When you have sleep apnea, this can help you sleep better so you feel much better. CPAP stands for \"continuous positive airway pressure. \" The CPAP machine will have one of the following: · A mask that covers your nose and mouth · Prongs that fit into your nose · A mask that covers your nose only, the most common type. This type is called NCPAP. The N stands for \"nasal.\" Why is it done? CPAP is usually the best treatment for obstructive sleep apnea. It is the first treatment choice and the most widely used. Your doctor may suggest CPAP if you have: · Moderate to severe sleep apnea. · Sleep apnea and coronary artery disease (CAD) or heart failure. How does it help? · CPAP can help you have more normal sleep, so you feel less sleepy and more alert during the daytime. · CPAP may help keep heart failure or other heart problems from getting worse. · CPAP may help lower your blood pressure. · If you use CPAP, your bed partner may also sleep better because you are not snoring or restless. What are the side effects? Some people who use CPAP have: · A dry or stuffy nose and a sore throat. · Irritated skin on the face. · Sore eyes. · Bloating. If you have any of these problems, work with your doctor to fix them. Here are some things you can try: · Be sure the mask or nasal prongs fit well. · See if your doctor can adjust the pressure of your CPAP. · If your nose is dry, try a humidifier. · If your nose is runny or stuffy, try decongestant medicine or a steroid nasal spray. Be safe with medicines. Read and follow all instructions on the label. Do not use the medicine longer than the label says. If these things do not help, you might try a different type of machine. Some machines have air pressure that adjusts on its own. Others have air pressures that are different when you breathe in than when you breathe out. This may reduce discomfort caused by too much pressure in your nose. Where can you learn more? Go to http://lupe-liliam.info/. Enter X863 in the search box to learn more about \"Learning About CPAP for Sleep Apnea. \" Current as of: May 12, 2017 Content Version: 11.4 © 2815-2327 [employer]. Care instructions adapted under license by [employer] (which disclaims liability or warranty for this information). If you have questions about a medical condition or this instruction, always ask your healthcare professional. [address] any warranty or liability for your use of this information. Introducing [facility]! Ann Marie Catherine introduces Gladitood patient portal. Now you can access parts of your medical record, email your doctor's office, and request medication refills online. 1. In your internet browser, go to [email or web address]w [employer] 2. Click on the First Time User? Click Here link in the Sign In box. You will see the New Member Sign Up page. 3. Enter your Gladitood Access Code exactly as it appears below. You will not need to use this code after youve completed the sign-up process.  If you do not sign up before the expiration date, you must request a new code. · Online Prasad Access Code: [email or web address] Expires: 9/9/2018  8:43 AM 
 
4. Enter the last four digits of your Social Security Number (xxxx) and Date of Birth (mm/dd/yyyy) as indicated and click Submit. You will be taken to the next sign-up page. 5. Create a Online Prasad ID. This will be your Online Prasad login ID and cannot be changed, so think of one that is secure and easy to remember. 6. Create a Online Prasad password. You can change your password at any time. 7. Enter your Password Reset Question and Answer. This can be used at a later time if you forget your password. 8. Enter your e-mail address. You will receive e-mail notification when new information is available in [address] 9. Click Sign Up. You can now view and download portions of your medical record. 10. Click the Download Summary menu link to download a portable copy of your medical information. If you have questions, please visit the Frequently Asked Questions section of the Online Prasad website. Remember, Online Prasad is NOT to be used for urgent needs. For medical emergencies, dial 911. Now available from your iPhone and Android! Please provide this summary of care documentation to your next provider. Your primary care clinician is listed as Cory Pickens. If you have any questions after today's visit, please call [phone].

## 2018-07-06 NOTE — PATIENT INSTRUCTIONS
Candidiasis: Care Instructions  Your Care Instructions  Candidiasis (say \"dlh-nxx-OX-uh-astrid\") is a yeast infection. Yeast normally lives in your body. But it can cause problems if your body's defenses don't work as they should. Some medicines can increase your chance of getting a yeast infection. These include antibiotics, steroids, and cancer drugs. And some diseases like AIDS and diabetes can make you more likely to get yeast infections. There are different types of yeast infections. Kassidy Veloz is a yeast infection in the mouth. It usually occurs in people with weak immune systems. It causes white patches inside the mouth and throat. Yeast infections of the skin usually occur in skin folds where the skin stays moist. They cause red, oozing patches on your skin. Babies can get these infections under the diaper. People who often wear gloves can get them on their hands. Many women get vaginal yeast infections. They are most common when women take antibiotics. These infections can cause the vagina to itch and burn. They also cause white discharge that looks like cottage cheese. In rare cases, yeast infects the blood. This can cause serious disease. This kind of infection is treated with medicine given through a needle into a vein (IV). After you start treatment, a yeast infection usually goes away quickly. But if your immune system is weak, the infection may come back. Tell your doctor if you get yeast infections often. Follow-up care is a key part of your treatment and safety. Be sure to make and go to all appointments, and call your doctor if you are having problems. It's also a good idea to know your test results and keep a list of the medicines you take. How can you care for yourself at home? · Take your medicines exactly as prescribed. Call your doctor if you think you are having a problem with your medicine. · Use antibiotics only as directed by your doctor. · Eat yogurt with live cultures.  It has bacteria called lactobacillus. It may help prevent some types of yeast infections. · Keep your skin clean and dry. Put powder on moist places. · If you are using a cream or suppository to treat a vaginal yeast infection, don't use condoms or a diaphragm. Use a different type of birth control. · Eat a healthy diet and get regular exercise. This will help keep your immune system strong. When should you call for help? Watch closely for changes in your health, and be sure to contact your doctor if:  ? · You do not get better as expected. Where can you learn more? Go to http://lupe-liliam.info/. Enter W232 in the search box to learn more about \"Candidiasis: Care Instructions. \"  Current as of: October 13, 2016  Content Version: 11.4  © 0295-4308 Cannonball. Care instructions adapted under license by [employer] (which disclaims liability or warranty for this information). If you have questions about a medical condition or this instruction, always ask your healthcare professional. [address] any warranty or liability for your use of this information. Learning About CPAP for Sleep Apnea  What is CPAP? CPAP is a small machine that you use at home every night while you sleep. It increases air pressure in your throat to keep your airway open. When you have sleep apnea, this can help you sleep better so you feel much better. CPAP stands for \"continuous positive airway pressure. \"  The CPAP machine will have one of the following:  · A mask that covers your nose and mouth  · Prongs that fit into your nose  · A mask that covers your nose only, the most common type. This type is called NCPAP. The N stands for \"nasal.\"  Why is it done? CPAP is usually the best treatment for obstructive sleep apnea. It is the first treatment choice and the most widely used. Your doctor may suggest CPAP if you have:  · Moderate to severe sleep apnea.   · Sleep apnea and coronary artery disease (CAD) or heart failure. How does it help? · CPAP can help you have more normal sleep, so you feel less sleepy and more alert during the daytime. · CPAP may help keep heart failure or other heart problems from getting worse. · CPAP may help lower your blood pressure. · If you use CPAP, your bed partner may also sleep better because you are not snoring or restless. What are the side effects? Some people who use CPAP have:  · A dry or stuffy nose and a sore throat. · Irritated skin on the face. · Sore eyes. · Bloating. If you have any of these problems, work with your doctor to fix them. Here are some things you can try:  · Be sure the mask or nasal prongs fit well. · See if your doctor can adjust the pressure of your CPAP. · If your nose is dry, try a humidifier. · If your nose is runny or stuffy, try decongestant medicine or a steroid nasal spray. Be safe with medicines. Read and follow all instructions on the label. Do not use the medicine longer than the label says. If these things do not help, you might try a different type of machine. Some machines have air pressure that adjusts on its own. Others have air pressures that are different when you breathe in than when you breathe out. This may reduce discomfort caused by too much pressure in your nose. Where can you learn more? Go to http://lupe-liliam.info/. Enter B522 in the search box to learn more about \"Learning About CPAP for Sleep Apnea. \"  Current as of: May 12, 2017  Content Version: 11.4  © 0794-4123 Spinlister. Care instructions adapted under license by [employer] (which disclaims liability or warranty for this information). If you have questions about a medical condition or this instruction, always ask your healthcare professional. [address] any warranty or liability for your use of this information.

## 2018-07-06 NOTE — PROGRESS NOTES
Reviewed record in preparation for visit and have obtained necessary documentation. Identified pt with two pt identifiers(name and ). Chief Complaint   Patient presents with    Complete Physical       Health Maintenance Due   Topic Date Due    Pneumococcal Vaccine (1 of  - PPSV23) 09/15/1999       Coordination of Care Questionnaire:  :     1) Have you been to an emergency room, urgent care clinic since your last visit?  yes Urgent Care DX: Bronchitis 2018 Hospitalized since your last visit? no             2) Have you seen or consulted any other health care providers outside of [address] [place] [address] since your last visit? no  (Include any pap smears or colon screenings in this section.)

## 2018-07-06 NOTE — PROGRESS NOTES
Nneka Putnam is a 40 y.o. male who presents for evaluation of cpe, new pt visit. No recent pcp. Girlfriend binh gunter, is also my patient. Had rough start to year with few bouts of bronchitis. Doing well now though. Works doing construction, has special breathing mask and eye protection, but still struggles with pulmonary issues at times. ROS:  Constitutional: negative for fevers, chills, anorexia and weight loss  Eyes:   negative for visual disturbance and irritation  ENT:   negative for tinnitus,sore throat,nasal congestion,ear pain,hoarseness  Respiratory:  negative for cough, hemoptysis, dyspnea,wheezing  CV:   negative for chest pain, palpitations, lower extremity edema  GI:   negative for nausea, vomiting, diarrhea, abdominal pain,melena  Genitourinary: negative for frequency, dysuria and hematuria  Musculoskel: negative for myalgias, arthralgias, back pain, muscle weakness, joint pain  Neurological:  negative for headaches, dizziness, focal weakness, numbness  Psychiatric:     Negative for depression or anxiety      Past Medical History:   Diagnosis Date    Depression        Past Surgical History:   Procedure Laterality Date    HX HEENT      Wisdon Teeth       Family History   Problem Relation Age of Onset    Asthma Mother     Migraines Mother     Hypertension Father     Cancer Father    Jimmy Fontenot Bladder Disease Brother        Social History     Social History    Marital status: SINGLE     Spouse name: N/A    Number of children: N/A    Years of education: N/A     Occupational History    Not on file.      Social History Main Topics    Smoking status: Former Smoker    Smokeless tobacco: Never Used    Alcohol use Yes    Drug use: No    Sexual activity: Yes     Partners: Female     Other Topics Concern    Not on file     Social History Narrative            Visit Vitals    /85 (BP 1 Location: Left arm, BP Patient Position: Sitting)    Pulse 85    Temp 98.4 °F (36.9 °C) (Oral)    Resp 18    Ht 6' 2\" (1.88 m)    Wt 305 lb 12.8 oz (138.7 kg)    SpO2 96%    BMI 39.26 kg/m2       Physical Examination:   General - Well appearing male  HEENT - PERRL, TM no erythema/opacification, normal nasal turbinates, no oropharyngeal erythema or exudate, MMM  Neck - supple, no bruits, no thyroidomegaly, no lymphadenopathy  Pulm - clear to auscultation bilaterally  Cardio - RRR, normal S1 S2, no murmur  Abd - soft, nontender, no masses, no HSM  Extrem - no edema, +2 distal pulses  Neuro-  No focal deficits, CN intact     Assessment/Plan:    1. Routine adult health maintenance--check cbc, cmp, flp, tsh, hiv, a1c  2. Candidiasis groin--rx for nystatin powder. Check a1c  3.  maryam--encouraged to get his cpap back in working order  4.  gerd--does well with prilosec  5. Obesity--encouraged to work on weight loss, which would help all of the above.     rtc one year, sooner if labs abn        Lachelle Hash III, DO

## 2018-07-07 LAB
ALBUMIN SERPL-MCNC: 4.2 G/DL (ref 3.5–5.5)
ALBUMIN/GLOB SERPL: 1.4 {RATIO} (ref 1.2–2.2)
ALP SERPL-CCNC: 108 IU/L (ref 39–117)
ALT SERPL-CCNC: 42 IU/L (ref 0–44)
AST SERPL-CCNC: 25 IU/L (ref 0–40)
BASOPHILS # BLD AUTO: 0 X10E3/UL (ref 0–0.2)
BASOPHILS NFR BLD AUTO: 0 %
BILIRUB SERPL-MCNC: 0.5 MG/DL (ref 0–1.2)
BUN SERPL-MCNC: 15 MG/DL (ref 6–20)
BUN/CREAT SERPL: 17 (ref 9–20)
CALCIUM SERPL-MCNC: 9.4 MG/DL (ref 8.7–10.2)
CHLORIDE SERPL-SCNC: 101 MMOL/L (ref 96–106)
CHOLEST SERPL-MCNC: 141 MG/DL (ref 100–199)
CO2 SERPL-SCNC: 25 MMOL/L (ref 20–29)
CREAT SERPL-MCNC: 0.9 MG/DL (ref 0.76–1.27)
EOSINOPHIL # BLD AUTO: 0.2 X10E3/UL (ref 0–0.4)
EOSINOPHIL NFR BLD AUTO: 2 %
ERYTHROCYTE [DISTWIDTH] IN BLOOD BY AUTOMATED COUNT: 14.5 % (ref 12.3–15.4)
EST. AVERAGE GLUCOSE BLD GHB EST-MCNC: 120 MG/DL
GLOBULIN SER CALC-MCNC: 3.1 G/DL (ref 1.5–4.5)
GLUCOSE SERPL-MCNC: 93 MG/DL (ref 65–99)
HBA1C MFR BLD: 5.8 % (ref 4.8–5.6)
HCT VFR BLD AUTO: 46 % (ref 37.5–51)
HDLC SERPL-MCNC: 33 MG/DL
HGB BLD-MCNC: 15.5 G/DL (ref 13–17.7)
HIV 1+2 AB+HIV1 P24 AG SERPL QL IA: NON REACTIVE
IMM GRANULOCYTES # BLD: 0 X10E3/UL (ref 0–0.1)
IMM GRANULOCYTES NFR BLD: 0 %
LDLC SERPL CALC-MCNC: 88 MG/DL (ref 0–99)
LYMPHOCYTES # BLD AUTO: 2.1 X10E3/UL (ref 0.7–3.1)
LYMPHOCYTES NFR BLD AUTO: 27 %
MCH RBC QN AUTO: 26.9 PG (ref 26.6–33)
MCHC RBC AUTO-ENTMCNC: 33.7 G/DL (ref 31.5–35.7)
MCV RBC AUTO: 80 FL (ref 79–97)
MONOCYTES # BLD AUTO: 0.9 X10E3/UL (ref 0.1–0.9)
MONOCYTES NFR BLD AUTO: 12 %
NEUTROPHILS # BLD AUTO: 4.6 X10E3/UL (ref 1.4–7)
NEUTROPHILS NFR BLD AUTO: 59 %
PLATELET # BLD AUTO: 224 X10E3/UL (ref 150–379)
POTASSIUM SERPL-SCNC: 4.4 MMOL/L (ref 3.5–5.2)
PROT SERPL-MCNC: 7.3 G/DL (ref 6–8.5)
RBC # BLD AUTO: 5.77 X10E6/UL (ref 4.14–5.8)
SODIUM SERPL-SCNC: 141 MMOL/L (ref 134–144)
TRIGL SERPL-MCNC: 98 MG/DL (ref 0–149)
TSH SERPL DL<=0.005 MIU/L-ACNC: 1.37 UIU/ML (ref 0.45–4.5)
VLDLC SERPL CALC-MCNC: 20 MG/DL (ref 5–40)
WBC # BLD AUTO: 7.9 X10E3/UL (ref 3.4–10.8)

## 2018-07-08 NOTE — PROGRESS NOTES
a1c 5.8 for average sugar of 120,consistent with pre/borderline diabetes. Work on limiting carbs, routine exercise--could meet with [facility] if interested. Losing 20 lbs would help. Other labs look great. rtc one year.

## 2018-07-12 NOTE — PROGRESS NOTES
[phone] ([place]) - left second message for pt to call [phone] for lab results. Letter sent.

## 2018-07-12 NOTE — PROGRESS NOTES
Pt called back. Lab results and Dr. Akiko Rose message were given to pt. He would like a letter sent to him also and will call back if he would like to schedule an appt for prediabetes self management.

## 2018-08-20 ENCOUNTER — OFFICE VISIT (OUTPATIENT)
Dept: URGENT CARE | Age: 38
End: 2018-08-20

## 2018-08-20 VITALS
RESPIRATION RATE: 18 BRPM | HEART RATE: 82 BPM | BODY MASS INDEX: 39.14 KG/M2 | HEIGHT: 74 IN | SYSTOLIC BLOOD PRESSURE: 130 MMHG | TEMPERATURE: 97.4 F | WEIGHT: 305 LBS | DIASTOLIC BLOOD PRESSURE: 88 MMHG | OXYGEN SATURATION: 98 %

## 2018-08-20 DIAGNOSIS — S51.832A PUNCTURE WOUND OF LEFT FOREARM, INITIAL ENCOUNTER: Primary | ICD-10-CM

## 2018-08-20 RX ORDER — CEPHALEXIN 500 MG/1
500 CAPSULE ORAL 2 TIMES DAILY
Qty: 20 CAP | Refills: 0 | Status: SHIPPED | OUTPATIENT
Start: 2018-08-20 | End: 2018-08-30

## 2018-08-20 NOTE — PROGRESS NOTES
HPI Comments: Sana Galeano presents with puncture wound from nail that penetrated left forearm while using nail gun 2 hrs PTA. Was able to pull nail out. Denies weakness, tingling, numbness. States that wound was bleeding significantly but not now. The history is provided by the patient. Past Medical History:   Diagnosis Date    Depression         Past Surgical History:   Procedure Laterality Date    HX HEENT      Wisdon Teeth         Family History   Problem Relation Age of Onset    Asthma Mother     Migraines Mother     Hypertension Father     Cancer Father    Abbenettrima Round Bladder Disease Brother         Social History     Social History    Marital status: SINGLE     Spouse name: N/A    Number of children: N/A    Years of education: N/A     Occupational History    Not on file. Social History Main Topics    Smoking status: Former Smoker    Smokeless tobacco: Never Used    Alcohol use Yes    Drug use: No    Sexual activity: Yes     Partners: Female     Other Topics Concern    Not on file     Social History Narrative                ALLERGIES: Mustard seed    Review of Systems   Constitutional: Negative for chills and fever. Respiratory: Negative for shortness of breath and wheezing. Cardiovascular: Negative for chest pain and palpitations. Musculoskeletal: Negative for myalgias. Skin: Positive for wound. Hematological: Negative for adenopathy. Vitals:    08/20/18 1647 08/20/18 1648   BP:  130/88   Pulse:  82   Resp:  18   Temp:  97.4 °F (36.3 °C)   SpO2:  98%   Weight: 305 lb (138.3 kg) 305 lb (138.3 kg)   Height: 6' 2\" (1.88 m) 6' 2\" (1.88 m)       Physical Exam   Constitutional: He appears well-developed and well-nourished. No distress. Musculoskeletal:        Left hand: Normal. He exhibits normal range of motion, no tenderness, no bony tenderness, normal two-point discrimination, normal capillary refill, no deformity, no laceration and no swelling. Normal sensation noted. Normal strength noted. Neurological: He is alert. He has normal strength. No sensory deficit. Skin: Laceration (left foreram, medial proximal aspect: 2x2mm puncture wound without active bleeding, slight TTP) noted. He is not diaphoretic. Psychiatric: He has a normal mood and affect. His behavior is normal. Judgment and thought content normal.   Nursing note and vitals reviewed. [facility]    ICD-10-CM ICD-9-CM    1. Puncture wound of left forearm, initial encounter S51.832A 881.00 TETANUS, DIPHTHERIA TOXOIDS AND ACELLULAR PERTUSSIS VACCINE (TDAP), IN INDIVIDS. >=7, IM     Medications Ordered Today   Medications    cephALEXin (KEFLEX) 500 mg capsule     Sig: Take 1 Cap by mouth two (2) times a day for 10 days. Dispense:  20 Cap     Refill:  0     The patients condition was discussed with the patient and they understand. The patient is to follow up with PCP. If signs and symptoms become worse the pt is to go to the ER. The patient is to take medications as prescribed.              Procedures

## 2018-08-20 NOTE — MR AVS SNAPSHOT
[address] [place] [address] 
[phone] Patient: Meli Zimmer MRN: [id number] XWT:[id number]/[id number] Visit Information Date & Time Provider Department Dept. Phone Encounter #  
 8/20/2018  4:45 PM [address] [facility] [phone] [id number] Upcoming Health Maintenance Date Due Influenza Age 5 to Adult 8/1/2018 Pneumococcal 19-64 Medium Risk (1 of 1 - PPSV23) 7/30/2020* DTaP/Tdap/Td series (2 - Td) 4/26/2022 *Topic was postponed. The date shown is not the original due date. Allergies as of 8/20/2018  Review Complete On: 8/20/2018 By: Mary Aranda MD  
  
 Severity Noted Reaction Type Reactions Mustard Seed  08/01/2014    Other (comments) Cotton mouth Current Immunizations  Never Reviewed Name Date TDAP Vaccine 4/26/2012 Tdap  Incomplete Not reviewed this visit You Were Diagnosed With   
  
 Codes Comments Puncture wound of left forearm, initial encounter    -  Primary ICD-10-CM: A59.893I ICD-9-CM: 881.00 Vitals BP Pulse Temp Resp Height(growth percentile) Weight(growth percentile) 130/88 82 97.4 °F (36.3 °C) 18 6' 2\" (1.88 m) 305 lb (138.3 kg) SpO2 BMI Smoking Status 98% 39.16 kg/m2 Former Smoker Vitals History BMI and BSA Data Body Mass Index Body Surface Area  
 39.16 kg/m 2 2.69 m 2 Preferred Pharmacy Pharmacy Name Phone [id number]- [address] [phone] Your Updated Medication List  
  
   
This list is accurate as of 8/20/18  4:53 PM.  Always use your most recent med list.  
  
  
  
  
 albuterol 90 mcg/actuation inhaler Commonly known as:  PROVENTIL HFA, VENTOLIN HFA, PROAIR HFA Take 2 puffs by inhalation every six (6) hours as needed for Wheezing. cephALEXin 500 mg capsule Commonly known as:  Fady Moya Take 1 Cap by mouth two (2) times a day for 10 days. ibuprofen 800 mg tablet Commonly known as:  MOTRIN Take 1 Tab by mouth every eight (8) hours as needed for Pain. LUBRICATING RELIEF 0.4-0.3 % Drop Generic drug:  peg 400-propylene glycol  
as needed. nystatin powder Commonly known as:  MYCOSTATIN Apply  to affected area four (4) times daily. PRILOSEC PO Take  by mouth.  
  
 triamcinolone 55 mcg nasal inhaler Commonly known as:  NASACORT AQ  
2 Sprays by Both Nostrils route daily. Prescriptions Sent to Pharmacy Refills  
 cephALEXin (KEFLEX) 500 mg capsule 0 Sig: Take 1 Cap by mouth two (2) times a day for 10 days. Class: Normal  
 Pharmacy: [facility]/pharmacy #[id number]- [address]  #: [phone] Route: Oral  
  
We Performed the Following TETANUS, DIPHTHERIA TOXOIDS AND ACELLULAR PERTUSSIS VACCINE (TDAP), IN INDIVIDS. >=7, IM [id number] CPT(R)] Patient Instructions Puncture Wounds: Care Instructions Your Care Instructions A puncture wound can happen anywhere on your body. These wounds tend to be narrower and deeper than cuts. A puncture wound is usually left open instead of being closed. This is because a puncture wound can be easily infected, and closing it can make infection even more likely. You will probably have a bandage over the wound. The doctor has checked you carefully, but problems can develop later. If you notice any problems or new symptoms, get medical treatment right away. Follow-up care is a key part of your treatment and safety. Be sure to make and go to all appointments, and call your doctor if you are having problems. It's also a good idea to know your test results and keep a list of the medicines you take. How can you care for yourself at home? · Keep the wound dry for the first 24 to 48 hours.  After this, you can shower if your doctor okays it. Pat the wound dry. · Don't soak the wound, such as in a bathtub. Your doctor will tell you when it's safe to get the wound wet. · If your doctor told you how to care for your wound, follow your doctor's instructions. If you did not get instructions, follow this general advice: ¨ After the first 24 to 48 hours, wash the wound with clean water 2 times a day. Don't use hydrogen peroxide or alcohol, which can slow healing. ¨ You may cover the wound with a thin layer of petroleum jelly, such as Vaseline, and a nonstick bandage. ¨ Apply more petroleum jelly and replace the bandage as needed. · Prop up the sore area on pillows anytime you sit or lie down during the next 3 days. Try to keep it above the level of your heart. This helps reduce swelling. · Avoid any activity that could cause your wound to get worse. · Be safe with medicines. Read and follow all instructions on the label. ¨ If the doctor gave you a prescription medicine for pain, take it as prescribed. ¨ If you are not taking a prescription pain medicine, ask your doctor if you can take an over-the-counter medicine. · If your doctor prescribed antibiotics, take them as directed. Do not stop taking them just because you feel better. You need to take the full course of antibiotics. When should you call for help? Call your doctor now or seek immediate medical care if: 
  · You have new pain, or your pain gets worse.  
  · The wound starts to bleed, and blood soaks through the bandage. Oozing small amounts of blood is normal.  
  · The skin near the wound is cold or pale or changes color.  
  · You have tingling, weakness, or numbness near the wound.  
  · You have trouble moving the area near the wound.  
  · You have symptoms of infection, such as: 
¨ Increased pain, swelling, warmth, or redness around the wound. ¨ Red streaks leading from the wound. ¨ Pus draining from the wound. ¨ A fever.  Watch closely for changes in your health, and be sure to contact your doctor if: 
  · The wound is not closing (getting smaller).  
  · You do not get better as expected. Where can you learn more? Go to http://lupe-liliam.info/. Enter Q134 in the search box to learn more about \"Puncture Wounds: Care Instructions. \" Current as of: November 20, 2017 Content Version: 11.7 © 5817-0428 [employer] Access. Care instructions adapted under license by [employer] (which disclaims liability or warranty for this information). If you have questions about a medical condition or this instruction, always ask your healthcare professional. [address] any warranty or liability for your use of this information. Introducing [facility]! Ronit Rosales introduces [employer] patient portal. Now you can access parts of your medical record, email your doctor's office, and request medication refills online. 1. In your internet browser, go to [email or web address] 2. Click on the First Time User? Click Here link in the Sign In box. You will see the New Member Sign Up page. 3. Enter your [employer] Access Code exactly as it appears below. You will not need to use this code after youve completed the sign-up process. If you do not sign up before the expiration date, you must request a new code. · [employer] Access Code: [email or web address] Expires: 9/9/2018  8:43 AM 
 
4. Enter the last four digits of your Social Security Number (xxxx) and Date of Birth (mm/dd/yyyy) as indicated and click Submit. You will be taken to the next sign-up page. 5. Create a [employer] Universe ID. This will be your [employer] login ID and cannot be changed, so think of one that is secure and easy to remember. 6. Create a [employer] password. You can change your password at any time. 7. Enter your Password Reset Question and Answer.  This can be used at a later time if you forget your password. 8. Enter your e-mail address. You will receive e-mail notification when new information is available in [address] 9. Click Sign Up. You can now view and download portions of your medical record. 10. Click the Download Summary menu link to download a portable copy of your medical information. If you have questions, please visit the Frequently Asked Questions section of the [employer] website. Remember, [employer] is NOT to be used for urgent needs. For medical emergencies, dial 911. Now available from your iPhone and Android! Please provide this summary of care documentation to your next provider. Your primary care clinician is listed as Clif Peres If you have any questions after today's visit, please call [phone].

## 2018-08-20 NOTE — PATIENT INSTRUCTIONS
Puncture Wounds: Care Instructions  Your Care Instructions    A puncture wound can happen anywhere on your body. These wounds tend to be narrower and deeper than cuts. A puncture wound is usually left open instead of being closed. This is because a puncture wound can be easily infected, and closing it can make infection even more likely. You will probably have a bandage over the wound. The doctor has checked you carefully, but problems can develop later. If you notice any problems or new symptoms, get medical treatment right away. Follow-up care is a key part of your treatment and safety. Be sure to make and go to all appointments, and call your doctor if you are having problems. It's also a good idea to know your test results and keep a list of the medicines you take. How can you care for yourself at home? · Keep the wound dry for the first 24 to 48 hours. After this, you can shower if your doctor okays it. Pat the wound dry. · Don't soak the wound, such as in a bathtub. Your doctor will tell you when it's safe to get the wound wet. · If your doctor told you how to care for your wound, follow your doctor's instructions. If you did not get instructions, follow this general advice:  ¨ After the first 24 to 48 hours, wash the wound with clean water 2 times a day. Don't use hydrogen peroxide or alcohol, which can slow healing. ¨ You may cover the wound with a thin layer of petroleum jelly, such as Vaseline, and a nonstick bandage. ¨ Apply more petroleum jelly and replace the bandage as needed. · Prop up the sore area on pillows anytime you sit or lie down during the next 3 days. Try to keep it above the level of your heart. This helps reduce swelling. · Avoid any activity that could cause your wound to get worse. · Be safe with medicines. Read and follow all instructions on the label. ¨ If the doctor gave you a prescription medicine for pain, take it as prescribed.   ¨ If you are not taking a prescription pain medicine, ask your doctor if you can take an over-the-counter medicine. · If your doctor prescribed antibiotics, take them as directed. Do not stop taking them just because you feel better. You need to take the full course of antibiotics. When should you call for help? Call your doctor now or seek immediate medical care if:    · You have new pain, or your pain gets worse.     · The wound starts to bleed, and blood soaks through the bandage. Oozing small amounts of blood is normal.     · The skin near the wound is cold or pale or changes color.     · You have tingling, weakness, or numbness near the wound.     · You have trouble moving the area near the wound.     · You have symptoms of infection, such as:  ¨ Increased pain, swelling, warmth, or redness around the wound. ¨ Red streaks leading from the wound. ¨ Pus draining from the wound. ¨ A fever.    Watch closely for changes in your health, and be sure to contact your doctor if:    · The wound is not closing (getting smaller).     · You do not get better as expected. Where can you learn more? Go to http://lupe-liliam.info/. Enter A137 in the search box to learn more about \"Puncture Wounds: Care Instructions. \"  Current as of: November 20, 2017  Content Version: 11.7  © 2365-7072 Healthwise, Incorporated. Care instructions adapted under license by [employer] (which disclaims liability or warranty for this information). If you have questions about a medical condition or this instruction, always ask your healthcare professional. [place] [address] any warranty or liability for your use of this information.

## 2018-09-05 RX ORDER — NYSTATIN 100000 [USP'U]/G
POWDER TOPICAL
Qty: 60 G | Refills: 1 | Status: SHIPPED | OUTPATIENT
Start: 2018-09-05 | End: 2018-11-24

## 2018-11-24 ENCOUNTER — OFFICE VISIT (OUTPATIENT)
Dept: URGENT CARE | Age: 38
End: 2018-11-24

## 2018-11-24 VITALS
DIASTOLIC BLOOD PRESSURE: 85 MMHG | SYSTOLIC BLOOD PRESSURE: 144 MMHG | WEIGHT: 299 LBS | TEMPERATURE: 98.7 F | HEIGHT: 74 IN | HEART RATE: 85 BPM | OXYGEN SATURATION: 98 % | BODY MASS INDEX: 38.37 KG/M2 | RESPIRATION RATE: 18 BRPM

## 2018-11-24 DIAGNOSIS — N41.0 ACUTE PROSTATITIS: Primary | ICD-10-CM

## 2018-11-24 LAB
BILIRUB UR QL STRIP: NEGATIVE
GLUCOSE UR-MCNC: NEGATIVE MG/DL
KETONES P FAST UR STRIP-MCNC: NEGATIVE MG/DL
PH UR STRIP: 5.5 [PH] (ref 4.6–8)
PROT UR QL STRIP: NEGATIVE
SP GR UR STRIP: 1.03 (ref 1–1.03)
UA UROBILINOGEN AMB POC: NORMAL (ref 0.2–1)
URINALYSIS CLARITY POC: NORMAL
URINALYSIS COLOR POC: NORMAL
URINE BLOOD POC: NEGATIVE
URINE LEUKOCYTES POC: NEGATIVE
URINE NITRITES POC: NEGATIVE

## 2018-11-24 RX ORDER — DOXYCYCLINE 100 MG/1
100 CAPSULE ORAL 2 TIMES DAILY
Qty: 28 CAP | Refills: 0 | Status: SHIPPED | OUTPATIENT
Start: 2018-11-24 | End: 2018-12-31 | Stop reason: ALTCHOICE

## 2018-11-24 NOTE — PROGRESS NOTES
Back Pain    The history is provided by the patient. This is a new problem. The current episode started more than 2 days ago. The problem has not changed since onset. The problem occurs constantly. The pain is associated with no known injury. The pain is present in the lower back. The quality of the pain is described as aching. Radiates to: left groin  and also in pereneam area- behind scrotum area  The pain is at a severity of 5/10. The pain is moderate. Pertinent negatives include no dysuria, no pelvic pain and no leg pain. He has tried nothing for the symptoms. Past Medical History:   Diagnosis Date    Depression         Past Surgical History:   Procedure Laterality Date    HX HEENT      Wisdon Teeth         Family History   Problem Relation Age of Onset    Asthma Mother     Migraines Mother     Hypertension Father     Cancer Father    Gabriela Messenger Bladder Disease Brother         Social History     Socioeconomic History    Marital status: SINGLE     Spouse name: Not on file    Number of children: Not on file    Years of education: Not on file    Highest education level: Not on file   Social Needs    Financial resource strain: Not on file    Food insecurity - worry: Not on file    Food insecurity - inability: Not on file   [employer] needs - medical: Not on file   [employer] needs - non-medical: Not on file   Occupational History    Not on file   Tobacco Use    Smoking status: Former Smoker    Smokeless tobacco: Never Used   Substance and Sexual Activity    Alcohol use: Yes    Drug use: No    Sexual activity: Yes     Partners: Female   Other Topics Concern    Not on file   Social History Narrative    Not on file                ALLERGIES: Mustard seed    Review of Systems   Genitourinary: Positive for frequency and urgency. Negative for discharge, dysuria, flank pain, hematuria, pelvic pain, penile pain, scrotal swelling and testicular pain.    Musculoskeletal: Positive for back pain.   All other systems reviewed and are negative. Vitals:    11/24/18 1754   BP: 144/85   Pulse: 85   Resp: 18   Temp: 98.7 °F (37.1 °C)   SpO2: 98%   Weight: 299 lb (135.6 kg)   Height: 6' 2\" (1.88 m)       Physical Exam   Constitutional: No distress. HENT:   Mouth/Throat: No oropharyngeal exudate. Eyes: No scleral icterus. Abdominal: Soft. Bowel sounds are normal. He exhibits no distension and no mass. There is no tenderness. There is no rebound and no guarding. Genitourinary:   Genitourinary Comments: defered   Musculoskeletal:        Lumbar back: Normal.   Skin: No rash noted. Nursing note and vitals reviewed. MDM    Procedures      ICD-10-CM ICD-9-CM    1. Acute prostatitis N41.0 601.0 AMB POC URINALYSIS DIP STICK AUTO W/O MICRO     Medications Ordered Today   Medications    doxycycline (MONODOX) 100 mg capsule     Sig: Take 1 Cap by mouth two (2) times a day. Dispense:  28 Cap     Refill:  0     Results for orders placed or performed in visit on 11/24/18   AMB POC URINALYSIS DIP STICK AUTO W/O MICRO   Result Value Ref Range    Color (UA POC)      Clarity (UA POC)      Glucose (UA POC) Negative Negative    Bilirubin (UA POC) Negative Negative    Ketones (UA POC) Negative Negative    Specific gravity (UA POC) 1.030 1.001 - 1.035    Blood (UA POC) Negative Negative    pH (UA POC) 5.5 4.6 - 8.0    Protein (UA POC) Negative Negative    Urobilinogen (UA POC) 0.2 mg/dL 0.2 - 1    Nitrites (UA POC) Negative Negative    Leukocyte esterase (UA POC) Negative Negative     The patients condition was discussed with the patient and they understand. The patient is to follow up with primary care doctor. If signs and symptoms become worse the pt is to go to the ER. The patient is to take medications as prescribed.

## 2018-11-24 NOTE — PATIENT INSTRUCTIONS
Prostatitis: Care Instructions  Your Care Instructions    The prostate gland is a small, walnut-shaped organ. It lies just below a man's bladder. It surrounds the urethra, the tube that carries urine through the penis and out of the body. Prostatitis is a painful condition caused by inflammation or infection of the prostate gland. Sometimes the condition is caused by bacteria, but often the cause is not known. Prostatitis caused by bacteria usually is treated with self-care and antibiotics. Follow-up care is a key part of your treatment and safety. Be sure to make and go to all appointments, and call your doctor if you are having problems. It's also a good idea to know your test results and keep a list of the medicines you take. How can you care for yourself at home? · If your doctor prescribed antibiotics, take them as directed. Do not stop taking them just because you feel better. You need to take the full course of antibiotics. · Take an over-the-counter pain medicine, such as acetaminophen (Tylenol), ibuprofen (Advil, Motrin), or naproxen (Aleve). Be safe with medicines. Read and follow all instructions on the label. · Take warm baths to help soothe pain. · Straining to pass stools can hurt when your prostate is inflamed. Avoid constipation. ? Include fruits, vegetables, beans, and whole grains in your diet each day. These foods are high in fiber. ? Drink plenty of fluids, enough so that your urine is light yellow or clear like water. If you have kidney, heart, or liver disease and have to limit fluids, talk with your doctor before you increase the amount of fluids you drink. ? Get some exercise every day. Build up slowly to 30 to 60 minutes a day on 5 or more days of the week. ? Take a fiber supplement, such as Citrucel or Metamucil, every day if needed. Read and follow all instructions on the label. ? Schedule time each day for a bowel movement. Having a daily routine may help.  Take your time and do not strain when having a bowel movement. · Avoid alcohol, caffeine, and spicy foods, especially if they make your symptoms worse. When should you call for help? Call your doctor now or seek immediate medical care if:    · You have symptoms of a urinary tract infection. These may include:  ? Pain or burning when you urinate. ? A frequent need to urinate without being able to pass much urine. ? Pain in the flank, which is just below the rib cage and above the waist on either side of the back. ? Blood in your urine. ? A fever.    Watch closely for changes in your health, and be sure to contact your doctor if:    · You cannot empty your bladder completely.     · You do not get better as expected. Where can you learn more? Go to http://lupe-liliam.info/. Enter D581 in the search box to learn more about \"Prostatitis: Care Instructions. \"  Current as of: December 3, 2017  Content Version: 11.8  © 1674-9355 [employer]. Care instructions adapted under license by [employer] (which disclaims liability or warranty for this information). If you have questions about a medical condition or this instruction, always ask your healthcare professional. [address] any warranty or liability for your use of this information.

## 2018-11-26 ENCOUNTER — OFFICE VISIT (OUTPATIENT)
Dept: INTERNAL MEDICINE CLINIC | Age: 38
End: 2018-11-26

## 2018-11-26 VITALS
HEART RATE: 91 BPM | SYSTOLIC BLOOD PRESSURE: 133 MMHG | DIASTOLIC BLOOD PRESSURE: 86 MMHG | OXYGEN SATURATION: 94 % | WEIGHT: 297 LBS | RESPIRATION RATE: 16 BRPM | BODY MASS INDEX: 38.12 KG/M2 | HEIGHT: 74 IN | TEMPERATURE: 97.8 F

## 2018-11-26 DIAGNOSIS — N41.0 ACUTE PROSTATITIS: ICD-10-CM

## 2018-11-26 DIAGNOSIS — Z23 ENCOUNTER FOR IMMUNIZATION: ICD-10-CM

## 2018-11-26 DIAGNOSIS — E66.01 OBESITY, MORBID (HCC): ICD-10-CM

## 2018-11-26 DIAGNOSIS — N50.812 PAIN IN LEFT TESTICLE: Primary | ICD-10-CM

## 2018-11-26 RX ORDER — TRAMADOL HYDROCHLORIDE 50 MG/1
50 TABLET ORAL
Qty: 30 TAB | Refills: 0 | Status: SHIPPED | OUTPATIENT
Start: 2018-11-26 | End: 2018-12-31 | Stop reason: ALTCHOICE

## 2018-11-26 RX ORDER — SULFAMETHOXAZOLE AND TRIMETHOPRIM 800; 160 MG/1; MG/1
1 TABLET ORAL 2 TIMES DAILY
Qty: 20 TAB | Refills: 0 | Status: SHIPPED | OUTPATIENT
Start: 2018-11-26 | End: 2018-12-06

## 2018-11-26 NOTE — PROGRESS NOTES
Reviewed record in preparation for visit and have obtained necessary documentation. Identified pt with two pt identifiers(name and ). Chief Complaint Patient presents with  Groin Pain x2 weeks Health Maintenance Due Topic Date Due  
 Flu Vaccine  2018 Mr. Ja Mcmillan has a reminder for a \"due or due soon\" health maintenance. I have asked that he discuss this further with his primary care provider for follow-up on this health maintenance. Coordination of Care Questionnaire: 
:  
 
1) Have you been to an emergency room, urgent care clinic since your last visit? yes Hospitalized since your last visit? no          
 
2) Have you seen or consulted any other health care providers outside of [address] [place] [address] since your last visit? no  (Include any pap smears or colon screenings in this section.) 3) In the event something were to happen to you and you were unable to speak on your behalf, do you have an Advance Directive/ Living Will in place stating your wishes? NO Do you have an Advance Directive on file? no 
 
4) Are you interested in receiving information on Advance Directives? NO Patient is accompanied by self I have received verbal consent from Shalini Alfred to discuss any/all medical information while they are present in the room.

## 2018-11-26 NOTE — PATIENT INSTRUCTIONS
Testicular Pain: Care Instructions Your Care Instructions Pain in the testicles can be caused by many things. These include an injury to your testicles, an infection, and testicular torsion. Injuries and genital problems most often happen during sports or recreational activities, at work, or in a fall. Pain caused by an injury usually goes away quickly. There is usually no long-term harm to your testicles. Infections that may cause pain include: · An infection of the testicles. This is called orchitis. · An abscess in the scrotum or testicles. · Some sexually transmitted infections (STIs). · A swelling of the tube attached to a testicle. This swelling is called epididymitis. It can cause pain and is sometimes caused by an infection. Testicular torsion happens when a testicle twists on the spermatic cord. This cuts off the blood supply to the testicle. This is a serious condition that requires surgery. Follow-up care is a key part of your treatment and safety. Be sure to make and go to all appointments, and call your doctor if you are having problems. It's also a good idea to know your test results and keep a list of the medicines you take. How can you care for yourself at home? · Rest and protect your testicles and groin. Stop, change, or take a break from any activity that may be causing your pain or soreness. · Put ice or a cold pack on the area for 10 to 20 minutes at a time. Put a thin cloth between the ice and your skin. · Wear briefs, not boxers. Briefs help support the injured area. You can use a jock strap if it helps relieve your pain. · If your doctor prescribed antibiotics, take them as directed. Do not stop taking them just because you feel better. You need to take the full course of antibiotics.  
· Ask your doctor if you can take an over-the-counter pain medicine, such as acetaminophen (Tylenol), ibuprofen (Advil, Motrin), or naproxen (Aleve). Be safe with medicines. Read and follow all instructions on the label. · If the doctor gave you a prescription medicine for pain, take it as prescribed. When should you call for help? Call your doctor now or seek immediate medical care if: 
  · You have severe or increasing pain.  
  · You notice a change in how your testicles look or are positioned in your scrotum.  
  · You notice new or worse swelling in your scrotum.  
  · You have symptoms of a urinary problem, such as a urinary tract infection. These may include: 
? Pain or burning when you urinate. ? A frequent need to urinate without being able to pass much urine. ? Pain in the flank, which is just below the rib cage and above the waist on either side of the back. ? Blood in your urine. ? A fever.  
 Watch closely for changes in your health, and be sure to contact your doctor if: 
  · You do not get better as expected. Where can you learn more? Go to http://lupe-liliam.info/. Enter Y748 in the search box to learn more about \"Testicular Pain: Care Instructions. \" Current as of: March 21, 2018 Content Version: 11.8 © 9992-8927 [employer]. Care instructions adapted under license by [employer] (which disclaims liability or warranty for this information). If you have questions about a medical condition or this instruction, always ask your healthcare professional. [address] any warranty or liability for your use of this information. Prostatitis: Care Instructions Your Care Instructions The prostate gland is a small, walnut-shaped organ. It lies just below a man's bladder. It surrounds the urethra, the tube that carries urine through the penis and out of the body. Prostatitis is a painful condition caused by inflammation or infection of the prostate gland. Sometimes the condition is caused by bacteria, but often the cause is not known. Prostatitis caused by bacteria usually is treated with self-care and antibiotics. Follow-up care is a key part of your treatment and safety. Be sure to make and go to all appointments, and call your doctor if you are having problems. It's also a good idea to know your test results and keep a list of the medicines you take. How can you care for yourself at home? · If your doctor prescribed antibiotics, take them as directed. Do not stop taking them just because you feel better. You need to take the full course of antibiotics. · Take an over-the-counter pain medicine, such as acetaminophen (Tylenol), ibuprofen (Advil, Motrin), or naproxen (Aleve). Be safe with medicines. Read and follow all instructions on the label. · Take warm baths to help soothe pain. · Straining to pass stools can hurt when your prostate is inflamed. Avoid constipation. ? Include fruits, vegetables, beans, and whole grains in your diet each day. These foods are high in fiber. ? Drink plenty of fluids, enough so that your urine is light yellow or clear like water. If you have kidney, heart, or liver disease and have to limit fluids, talk with your doctor before you increase the amount of fluids you drink. ? Get some exercise every day. Build up slowly to 30 to 60 minutes a day on 5 or more days of the week. ? Take a fiber supplement, such as Citrucel or Metamucil, every day if needed. Read and follow all instructions on the label. ? Schedule time each day for a bowel movement. Having a daily routine may help. Take your time and do not strain when having a bowel movement. · Avoid alcohol, caffeine, and spicy foods, especially if they make your symptoms worse. When should you call for help? Call your doctor now or seek immediate medical care if: 
  · You have symptoms of a urinary tract infection. These may include: 
? Pain or burning when you urinate. ? A frequent need to urinate without being able to pass much urine. ? Pain in the flank, which is just below the rib cage and above the waist on either side of the back. ? Blood in your urine. ? A fever.  
 Watch closely for changes in your health, and be sure to contact your doctor if: 
  · You cannot empty your bladder completely.  
  · You do not get better as expected. Where can you learn more? Go to http://lupe-liliam.info/. Enter I359 in the search box to learn more about \"Prostatitis: Care Instructions. \" Current as of: December 3, 2017 Content Version: 11.8 © 3833-4490 [employer]. Care instructions adapted under license by [employer] (which disclaims liability or warranty for this information). If you have questions about a medical condition or this instruction, always ask your healthcare professional. [address] any warranty or liability for your use of this information. If pain not better by next Monday, let me know and will have you see urology. I think you can wait a few days to see if you get better before scheduling the ultrasound.

## 2018-11-26 NOTE — PROGRESS NOTES
Wilman Addison is a 45 y.o. male who presents for evaluation of left testicle pain with radiation to left groin and then into low back. Onset about 2 weeks ago. Denies any trauma. Was initially intermittent, but now more constant. Worse when laying down. Unable to sleep last night. Went to  on Saturday, ua clear, rx given for doxycyline, which has not helped at all. Denies any f/c or dysuria. No hematuria or penile discharge. No loss of control with bowels or bladder. No issues with ejaculation. Has not seen any lumps or bulges in groin area. Same testicle swelled when he was a young boy, had normal workup then. Girlfriend binh with him today. ROS: 
Constitutional: negative for fevers, chills, anorexia and weight loss Eyes:   negative for visual disturbance and irritation ENT:   negative for tinnitus,sore throat,nasal congestion,ear pain,hoarseness Respiratory:  negative for cough, hemoptysis, dyspnea,wheezing CV:   negative for chest pain, palpitations, lower extremity edema GI:   negative for nausea, vomiting, diarrhea, abdominal pain,melena Genitourinary: negative for frequency, dysuria and hematuria. ++pain in left testicle and into left groin. Musculoskel: negative for myalgias, arthralgias, back pain, muscle weakness, joint pain Neurological:  negative for headaches, dizziness, focal weakness, numbness Psychiatric:     Negative for depression or anxiety Past Medical History:  
Diagnosis Date  Depression Past Surgical History:  
Procedure Laterality Date  HX HEENT Wisdon Teeth Family History Problem Relation Age of Onset  Asthma Mother  Migraines Mother  Hypertension Father  Cancer Father  Gall Bladder Disease Brother Social History Socioeconomic History  Marital status: SINGLE Spouse name: Not on file  Number of children: Not on file  Years of education: Not on file  Highest education level: Not on file Social Needs  Financial resource strain: Not on file  Food insecurity - worry: Not on file  Food insecurity - inability: Not on file  Transportation needs - medical: Not on file  Transportation needs - non-medical: Not on file Occupational History  Not on file Tobacco Use  Smoking status: Former Smoker  Smokeless tobacco: Never Used Substance and Sexual Activity  Alcohol use: Yes  Drug use: No  
 Sexual activity: Yes  
  Partners: Female Other Topics Concern  Not on file Social History Narrative  Not on file Visit Vitals /86 (BP 1 Location: Left arm, BP Patient Position: Sitting) Pulse 91 Temp 97.8 °F (36.6 °C) (Oral) Resp 16 Ht 6' 2\" (1.88 m) Wt 297 lb (134.7 kg) SpO2 94% BMI 38.13 kg/m² Physical Examination:  
General - Well appearing male HEENT - PERRL, TM no erythema/opacification, normal nasal turbinates, no oropharyngeal erythema or exudate, MMM Neck - supple, no bruits, no thyroidomegaly, no lymphadenopathy Pulm - clear to auscultation bilaterally Cardio - RRR, normal S1 S2, no murmur Abd - soft, nontender, no masses, no HSM. No inguinal hernia palpated. No masses felt on testes either. Extrem - no edema, +2 distal pulses Neuro-  No focal deficits, CN intact Assessment/Plan: 1. Acute prostatitis--stop doxycycline. Start bactrim ds. rx given for ultram. 
2.  Left testicle pain--ultrasound ordered. If pain still bothering him after a few days of bactrim, would do ultrasound then and consider referral to urology. 3.  maryam-- 
4. Obesity--weight down 8 lbs from last visit 
 
rtc prn for regular visit. Had flu shot last week. Pneumovax 23 given today, as he had pna last year.  
 
 
 
Nelly Mcgowan III, DO

## 2018-12-03 ENCOUNTER — TELEPHONE (OUTPATIENT)
Dept: INTERNAL MEDICINE CLINIC | Age: 38
End: 2018-12-03

## 2018-12-03 ENCOUNTER — HOSPITAL ENCOUNTER (OUTPATIENT)
Dept: ULTRASOUND IMAGING | Age: 38
Discharge: HOME | End: 2018-12-03
Attending: INTERNAL MEDICINE
Payer: COMMERCIAL

## 2018-12-03 DIAGNOSIS — N50.812 PAIN IN LEFT TESTICLE: ICD-10-CM

## 2018-12-03 PROCEDURE — 76870 US EXAM SCROTUM: CPT

## 2018-12-03 NOTE — PROGRESS NOTES
I have attempted to contact this patient by phone with the following results:   Ultrasound of scrotum looks ok. If still having pain, you should see urology, Dr. Stefani Joshi. Results mailed to patient's home.

## 2018-12-03 NOTE — TELEPHONE ENCOUNTER
Spoke with patient after 2 patient identifiers being note and advised per Dr. Montana Flores Ultrasound of scrotum looks ok. Suezanne Organ still having pain, he should see urology,dr enrique House.  (i think tori already gave him this referral). Patient expressed understanding and has no further questions at this time.

## 2018-12-03 NOTE — TELEPHONE ENCOUNTER
Pt returning call to cookie.  Best contact [phone]     Copy/Paste from [facility]

## 2018-12-03 NOTE — TELEPHONE ENCOUNTER
----- Message from Maritatanner Mitchell sent at 12/3/2018  9:16 AM EST -----  Regarding: Dr. Tevin Marie (girlfriend) calling requesting a call from the nurse in regards to the pt. She stated he is going for ultrasound this morning at [place]. Pt's pain in testicles and scrotum swelling continues. Best contact [phone].     Copy/paste [employer]

## 2018-12-03 NOTE — PROGRESS NOTES
Ultrasound of scrotum looks ok. If still having pain, he should see urology,dr enrique galicia.  (i think troi already gave him this referral).

## 2018-12-31 ENCOUNTER — OFFICE VISIT (OUTPATIENT)
Dept: INTERNAL MEDICINE CLINIC | Age: 38
End: 2018-12-31

## 2018-12-31 VITALS
TEMPERATURE: 98 F | HEART RATE: 88 BPM | DIASTOLIC BLOOD PRESSURE: 68 MMHG | WEIGHT: 303.4 LBS | RESPIRATION RATE: 16 BRPM | SYSTOLIC BLOOD PRESSURE: 111 MMHG | HEIGHT: 74 IN | OXYGEN SATURATION: 95 % | BODY MASS INDEX: 38.94 KG/M2

## 2018-12-31 DIAGNOSIS — M25.552 LEFT HIP PAIN: Primary | ICD-10-CM

## 2018-12-31 DIAGNOSIS — N50.812 PAIN IN LEFT TESTICLE: ICD-10-CM

## 2018-12-31 DIAGNOSIS — M25.552 LEFT HIP PAIN: ICD-10-CM

## 2018-12-31 DIAGNOSIS — E66.01 OBESITY, MORBID (HCC): ICD-10-CM

## 2018-12-31 DIAGNOSIS — M54.42 LEFT-SIDED LOW BACK PAIN WITH LEFT-SIDED SCIATICA, UNSPECIFIED CHRONICITY: Primary | ICD-10-CM

## 2018-12-31 DIAGNOSIS — G47.33 OSA (OBSTRUCTIVE SLEEP APNEA): ICD-10-CM

## 2018-12-31 DIAGNOSIS — M54.42 ACUTE BACK PAIN WITH SCIATICA, LEFT: ICD-10-CM

## 2018-12-31 RX ORDER — PREDNISONE 20 MG/1
TABLET ORAL
Qty: 18 TAB | Refills: 0 | Status: SHIPPED | OUTPATIENT
Start: 2018-12-31 | End: 2019-11-01 | Stop reason: ALTCHOICE

## 2018-12-31 NOTE — PROGRESS NOTES
Demetrio Hubbard is a 45 y.o. male    Chief Complaint   Patient presents with    Back Pain     Wondering what the next step is, lower pack pain down to groin and left leg.  Shoulder Pain     right     1. Have you been to the ER, urgent care clinic since your last visit? Hospitalized since your last visit? No    2. Have you seen or consulted any other health care providers outside of the [address] [place] [address] since your last visit? Include any pap smears or colon screening. Urology (CT scan) 3weeks ago.         Visit Vitals  /68 (BP 1 Location: Left arm, BP Patient Position: Sitting)   Pulse 88   Temp 98 °F (36.7 °C) (Oral)   Resp 16   Ht 6' 2\" (1.88 m)   Wt 303 lb 6.4 oz (137.6 kg)   SpO2 95%   BMI 38.95 kg/m²

## 2018-12-31 NOTE — PROGRESS NOTES
Kirill Alonso is a 45 y.o. male who presents for evaluation of continued pain. Last seen by me nov 26, 2018 when he was having left testicle pain. Finished doxycyline for possible epididymitis/prostatitis. Saw urology as well, had ct abd/pelvis that looked ok (did have some gallstones, but no issues with kidneys or prostate). Now complains more of pain in low back with some left sided sciatica, and left hip pain. Denies any specific injuries. Right shoulder also bothering him some. ROS:  Constitutional: negative for fevers, chills, anorexia and weight loss  Eyes:   negative for visual disturbance and irritation  ENT:   negative for tinnitus,sore throat,nasal congestion,ear pain,hoarseness  Respiratory:  negative for cough, hemoptysis, dyspnea,wheezing  CV:   negative for chest pain, palpitations, lower extremity edema  GI:   negative for nausea, vomiting, diarrhea, abdominal pain,melena  Genitourinary: negative for frequency, dysuria and hematuria  Musculoskel: negative for myalgias, arthralgias,muscle weakness.   ++right shoulder and left hip joint pain, also low back pain  Neurological:  negative for headaches, dizziness, focal weakness, numbness  Psychiatric:     Negative for depression or anxiety      Past Medical History:   Diagnosis Date    Depression        Past Surgical History:   Procedure Laterality Date    HX HEENT      Wisdon Teeth       Family History   Problem Relation Age of Onset    Asthma Mother     Migraines Mother     Hypertension Father     Cancer Father    Szymanski Harder Bladder Disease Brother        Social History     Socioeconomic History    Marital status: SINGLE     Spouse name: Not on file    Number of children: Not on file    Years of education: Not on file    Highest education level: Not on file   Social Needs    Financial resource strain: Not on file    Food insecurity - worry: Not on file    Food insecurity - inability: Not on file   [employer] needs - medical: Not on file    Transportation needs - non-medical: Not on file   Occupational History    Not on file   Tobacco Use    Smoking status: Former Smoker    Smokeless tobacco: Never Used   Substance and Sexual Activity    Alcohol use: Yes    Drug use: No    Sexual activity: Yes     Partners: Female   Other Topics Concern    Not on file   Social History Narrative    Not on file            Visit Vitals  /68 (BP 1 Location: Left arm, BP Patient Position: Sitting)   Pulse 88   Temp 98 °F (36.7 °C) (Oral)   Resp 16   Ht 6' 2\" (1.88 m)   Wt 303 lb 6.4 oz (137.6 kg)   SpO2 95%   BMI 38.95 kg/m²       Physical Examination:   General - Well appearing male  HEENT - PERRL, TM no erythema/opacification, normal nasal turbinates, no oropharyngeal erythema or exudate, MMM  Neck - supple, no bruits, no thyroidomegaly, no lymphadenopathy  Pulm - clear to auscultation bilaterally  Cardio - RRR, normal S1 S2, no murmur  Abd - soft, nontender, no masses, no HSM  Extrem - no edema, +2 distal pulses  Neuro-  No focal deficits, CN intact     Assessment/Plan:    1. Low back pain with left sided sciatica--rx for prednisone. Check xray of lumbar spine  2. Left groin pain--check xray of left hip. Suspect will need to see either PT or ortho, depending on xray results  3.   Right shoulder bursitis--prednisone should help here as well  4.  gerd--continue omeprazole  5.  maryam--    rtc prn for regular visit        Sylvia March III, DO

## 2018-12-31 NOTE — PATIENT INSTRUCTIONS
Hip Pain: Care Instructions  Your Care Instructions    Hip pain may be caused by many things, including overuse, a fall, or a twisting movement. Another cause of hip pain is arthritis. Your pain may increase when you stand up, walk, or squat. The pain may come and go or may be constant. Home treatment can help relieve hip pain, swelling, and stiffness. If your pain is ongoing, you may need more tests and treatment. Follow-up care is a key part of your treatment and safety. Be sure to make and go to all appointments, and call your doctor if you are having problems. It's also a good idea to know your test results and keep a list of the medicines you take. How can you care for yourself at home? · Take pain medicines exactly as directed. ? If the doctor gave you a prescription medicine for pain, take it as prescribed. ? If you are not taking a prescription pain medicine, ask your doctor if you can take an over-the-counter medicine. · Rest and protect your hip. Take a break from any activity, including standing or walking, that may cause pain. · Put ice or a cold pack against your hip for 10 to 20 minutes at a time. Try to do this every 1 to 2 hours for the next 3 days (when you are awake) or until the swelling goes down. Put a thin cloth between the ice and your skin. · Sleep on your healthy side with a pillow between your knees, or sleep on your back with pillows under your knees. · If there is no swelling, you can put moist heat, a heating pad, or a warm cloth on your hip. Do gentle stretching exercises to help keep your hip flexible. · Learn how to prevent falls. Have your vision and hearing checked regularly. Wear slippers or shoes with a nonskid sole. · Stay at a healthy weight. · Wear comfortable shoes. When should you call for help? Call 911 anytime you think you may need emergency care.  For example, call if:    · You have sudden chest pain and shortness of breath, or you cough up blood.     · You are not able to stand or walk or bear weight.     · Your buttocks, legs, or feet feel numb or tingly.     · Your leg or foot is cool or pale or changes color.     · You have severe pain.    Call your doctor now or seek immediate medical care if:    · You have signs of infection, such as:  ? Increased pain, swelling, warmth, or redness in the hip area. ? Red streaks leading from the hip area. ? Pus draining from the hip area. ? A fever.     · You have signs of a blood clot, such as:  ? Pain in your calf, back of the knee, thigh, or groin. ? Redness and swelling in your leg or groin.     · You are not able to bend, straighten, or move your leg normally.     · You have trouble urinating or having bowel movements.    Watch closely for changes in your health, and be sure to contact your doctor if:    · You do not get better as expected. Where can you learn more? Go to http://lupe-liliam.info/. Enter S129 in the search box to learn more about \"Hip Pain: Care Instructions. \"  Current as of: November 20, 2017  Content Version: 11.8  © 9864-8951 [employer]. Care instructions adapted under license by [employer] (which disclaims liability or warranty for this information). If you have questions about a medical condition or this instruction, always ask your healthcare professional. [place] [address] any warranty or liability for your use of this information. Ice also helps with inflammation and pain. Use ice for 15-20 minutes few times each day.

## 2019-03-25 ENCOUNTER — TELEPHONE (OUTPATIENT)
Dept: INTERNAL MEDICINE CLINIC | Age: 39
End: 2019-03-25

## 2019-03-25 NOTE — TELEPHONE ENCOUNTER
----- Message from [facility] sent at 3/25/2019  9:44 AM EDT -----  Regarding: Dr. Elizabeth Chimera: [phone]  Pt is requesting a same day appt today for a chest cold?       Message copied/pasted from Rosanne Flores

## 2019-11-01 ENCOUNTER — OFFICE VISIT (OUTPATIENT)
Dept: INTERNAL MEDICINE CLINIC | Age: 39
End: 2019-11-01

## 2019-11-01 VITALS
RESPIRATION RATE: 14 BRPM | BODY MASS INDEX: 40.07 KG/M2 | DIASTOLIC BLOOD PRESSURE: 81 MMHG | WEIGHT: 312.2 LBS | HEIGHT: 74 IN | TEMPERATURE: 97.9 F | HEART RATE: 81 BPM | SYSTOLIC BLOOD PRESSURE: 121 MMHG | OXYGEN SATURATION: 97 %

## 2019-11-01 DIAGNOSIS — G47.33 OSA (OBSTRUCTIVE SLEEP APNEA): ICD-10-CM

## 2019-11-01 DIAGNOSIS — S32.010S COMPRESSION FRACTURE OF L1 VERTEBRA, SEQUELA: ICD-10-CM

## 2019-11-01 DIAGNOSIS — Z00.00 ROUTINE ADULT HEALTH MAINTENANCE: Primary | ICD-10-CM

## 2019-11-01 DIAGNOSIS — E66.01 OBESITY, MORBID (HCC): ICD-10-CM

## 2019-11-01 DIAGNOSIS — S82.892D CLOSED FRACTURE OF LEFT ANKLE WITH ROUTINE HEALING, SUBSEQUENT ENCOUNTER: ICD-10-CM

## 2019-11-01 RX ORDER — TRIAMCINOLONE ACETONIDE 55 UG/1
2 SPRAY, METERED NASAL DAILY
COMMUNITY
End: 2019-12-20

## 2019-11-01 RX ORDER — GUAIFENESIN AND PSEUDOEPHEDRINE HCL 1200; 120 MG/1; MG/1
1 TABLET, EXTENDED RELEASE ORAL
COMMUNITY
End: 2022-02-16

## 2019-11-01 RX ORDER — ALBUTEROL SULFATE 90 UG/1
2 AEROSOL, METERED RESPIRATORY (INHALATION)
COMMUNITY
Start: 2014-11-25

## 2019-11-01 NOTE — PROGRESS NOTES
Reviewed record in preparation for visit and have obtained necessary documentation. Identified pt with two pt identifiers(name and ). Chief Complaint   Patient presents with    Annual Exam    Ankle Pain     left; injured on 2019       Health Maintenance Due   Topic Date Due    Influenza Age 5 to Adult  2019       Mr. Lu Weathers has a reminder for a \"due or due soon\" health maintenance. I have asked that he discuss health maintenance topic(s) due with His  primary care provider. Coordination of Care Questionnaire:  :     1) Have you been to an emergency room, urgent care clinic since your last visit? no   Hospitalized since your last visit? no             2) Have you seen or consulted any other health care providers outside of [address] [place] [address] since your last visit? no  (Include any pap smears or colon screenings in this section.)    3) Do you have an Advance Directive on file? no    4) Are you interested in receiving information on Advance Directives? NO    Patient is accompanied by self I have received verbal consent from Erum Rogers to discuss any/all medical information while they are present in the room.

## 2019-11-01 NOTE — PATIENT INSTRUCTIONS
Learning About CPAP for Sleep Apnea  What is CPAP? CPAP is a small machine that you use at home every night while you sleep. It increases air pressure in your throat to keep your airway open. When you have sleep apnea, this can help you sleep better so you feel much better. CPAP stands for \"continuous positive airway pressure. \"  The CPAP machine will have one of the following:  · A mask that covers your nose and mouth  · Prongs that fit into your nose  · A mask that covers your nose only, the most common type. This type is called NCPAP. The N stands for \"nasal.\"  Why is it done? CPAP is usually the best treatment for obstructive sleep apnea. It is the first treatment choice and the most widely used. Your doctor may suggest CPAP if you have:  · Moderate to severe sleep apnea. · Sleep apnea and coronary artery disease (CAD). · Sleep apnea and heart failure. How does it help? · CPAP can help you have more normal sleep, so you feel less sleepy and more alert during the daytime. · CPAP may help keep heart failure or other heart problems from getting worse. · CPAP may help lower your blood pressure. · If you use CPAP, your bed partner may also sleep better because you are not snoring or restless. What are the side effects? Some people who use CPAP have:  · A dry or stuffy nose and a sore throat. · Irritated skin on the face. · Sore eyes. · Bloating. If you have any of these problems, work with your doctor to fix them. Here are some things you can try:  · Be sure the mask or nasal prongs fit well. · See if your doctor can adjust the pressure of your CPAP. · If your nose is dry, try a humidifier. · If your nose is runny or stuffy, try decongestant medicine or a steroid nasal spray. Be safe with medicines. Read and follow all instructions on the label. Do not use the medicine longer than the label says. If these things do not help, you might try a different type of machine.  Some machines have air pressure that adjusts on its own. Others have air pressures that are different when you breathe in than when you breathe out. This may reduce discomfort caused by too much pressure in your nose. Where can you learn more? Go to http://lupe-liliam.info/. Enter Z831 in the search box to learn more about \"Learning About CPAP for Sleep Apnea. \"  Current as of: June 9, 2019  Content Version: 12.2  © 4045-6267 [employer]. Care instructions adapted under license by [employer] (which disclaims liability or warranty for this information). If you have questions about a medical condition or this instruction, always ask your healthcare professional. [place] [address] any warranty or liability for your use of this information. Send us the information for what supplies you need for your cpap, and we will send the order in for you. Come back for fasting labs, lab opens 8 am, mon-fri. No appt needed. Nothing to eat after MN, but drink plenty of water. Want to be fasting for at least 8 hours, prefer close to 12 hours.

## 2019-11-01 NOTE — PROGRESS NOTES
Fernanda Rubio is a 44 y.o. male who presents for evaluation of cpe. Last seen by me dec 31, 2018. Had bad fall in April, fell while at work about 14 feet, broke a bone in his left ankle, and had L1 comp fx. Was in walking boot for a few months, but never needed any sx. Fractures are healing, and he is back to work and feeling better. Follows with dr Honorio Langley for orthopedics.       ROS:  Constitutional: negative for fevers, chills, anorexia and weight loss  Eyes:   negative for visual disturbance and irritation  ENT:   negative for tinnitus,sore throat,nasal congestion,ear pain,hoarseness  Respiratory:  negative for cough, hemoptysis, dyspnea,wheezing  CV:   negative for chest pain, palpitations, lower extremity edema  GI:   negative for nausea, vomiting, diarrhea, abdominal pain,melena  Genitourinary: negative for frequency, dysuria and hematuria  Musculoskel: negative for myalgias, arthralgias, back pain, muscle weakness, joint pain  Neurological:  negative for headaches, dizziness, focal weakness, numbness  Psychiatric:     Negative for depression or anxiety      Past Medical History:   Diagnosis Date    Compression fx, lumbar spine (HCC)     L1 vertebrae    Depression     Fx ankle 04/23/2019    left       Past Surgical History:   Procedure Laterality Date    HX HEENT      Wisdon Teeth       Family History   Problem Relation Age of Onset    Asthma Mother     Migraines Mother     Hypertension Father     Cancer Father    Dianehomero Dueñasblaine Bladder Disease Brother        Social History     Socioeconomic History    Marital status: SINGLE     Spouse name: Not on file    Number of children: Not on file    Years of education: Not on file    Highest education level: Not on file   Occupational History    Not on file   Social Needs    Financial resource strain: Not on file    Food insecurity:     Worry: Not on file     Inability: Not on file    Transportation needs:     Medical: Not on file     Non-medical: Not on file   Tobacco Use    Smoking status: Former Smoker    Smokeless tobacco: Never Used   Substance and Sexual Activity    Alcohol use: Yes    Drug use: No    Sexual activity: Yes     Partners: Female   Lifestyle    Physical activity:     Days per week: Not on file     Minutes per session: Not on file    Stress: Not on file   Relationships    Social connections:     Talks on phone: Not on file     Gets together: Not on file     Attends Rest[employer] service: Not on file     Active member of club or organization: Not on file     Attends meetings of clubs or organizations: Not on file     Relationship status: Not on file    Intimate partner violence:     Fear of current or ex partner: Not on file     Emotionally abused: Not on file     Physically abused: Not on file     Forced sexual activity: Not on file   Other Topics Concern    Not on file   Social History Narrative    Not on file            Visit Vitals  /81 (BP 1 Location: Left arm, BP Patient Position: Sitting)   Pulse 81   Temp 97.9 °F (36.6 °C) (Oral)   Resp 14   Ht 6' 2\" (1.88 m)   Wt 312 lb 3.2 oz (141.6 kg)   SpO2 97%   BMI 40.08 kg/m²       Physical Examination:   General - Well appearing male  HEENT - PERRL, TM no erythema/opacification, normal nasal turbinates, no oropharyngeal erythema or exudate, MMM  Neck - supple, no bruits, no thyroidomegaly, no lymphadenopathy  Pulm - clear to auscultation bilaterally  Cardio - RRR, normal S1 S2, no murmur  Abd - soft, nontender, no masses, no HSM  Extrem - no edema, +2 distal pulses  Neuro-  No focal deficits, CN intact     Assessment/Plan:    1. Routine adult health maintenance--check cbc, cmp, flp, tsh, a1c  2. maryam--needs some parts. Asked him to let us know what supplies he needs and we can order for him  3. Left ankle fx--healing, out of walking boot  4. L1 comp fx, sp fall--appears to be healing as well, pain resolved  5.   Prediabetes--last a1c 5.8, check again    rtc one year, sooner if labs abn.         Debo Aranda III, DO

## 2019-11-09 LAB
ALBUMIN SERPL-MCNC: 3.9 G/DL (ref 3.5–5.5)
ALBUMIN/GLOB SERPL: 1.4 {RATIO} (ref 1.2–2.2)
ALP SERPL-CCNC: 110 IU/L (ref 39–117)
ALT SERPL-CCNC: 45 IU/L (ref 0–44)
AST SERPL-CCNC: 26 IU/L (ref 0–40)
BASOPHILS # BLD AUTO: 0 X10E3/UL (ref 0–0.2)
BASOPHILS NFR BLD AUTO: 1 %
BILIRUB SERPL-MCNC: 0.2 MG/DL (ref 0–1.2)
BUN SERPL-MCNC: 11 MG/DL (ref 6–20)
BUN/CREAT SERPL: 13 (ref 9–20)
CALCIUM SERPL-MCNC: 9 MG/DL (ref 8.7–10.2)
CHLORIDE SERPL-SCNC: 105 MMOL/L (ref 96–106)
CHOLEST SERPL-MCNC: 130 MG/DL (ref 100–199)
CO2 SERPL-SCNC: 23 MMOL/L (ref 20–29)
CREAT SERPL-MCNC: 0.83 MG/DL (ref 0.76–1.27)
EOSINOPHIL # BLD AUTO: 0.2 X10E3/UL (ref 0–0.4)
EOSINOPHIL NFR BLD AUTO: 4 %
ERYTHROCYTE [DISTWIDTH] IN BLOOD BY AUTOMATED COUNT: 13.3 % (ref 12.3–15.4)
EST. AVERAGE GLUCOSE BLD GHB EST-MCNC: 117 MG/DL
GLOBULIN SER CALC-MCNC: 2.8 G/DL (ref 1.5–4.5)
GLUCOSE SERPL-MCNC: 103 MG/DL (ref 65–99)
HBA1C MFR BLD: 5.7 % (ref 4.8–5.6)
HCT VFR BLD AUTO: 47.5 % (ref 37.5–51)
HDLC SERPL-MCNC: 29 MG/DL
HGB BLD-MCNC: 15.4 G/DL (ref 13–17.7)
IMM GRANULOCYTES # BLD AUTO: 0 X10E3/UL (ref 0–0.1)
IMM GRANULOCYTES NFR BLD AUTO: 0 %
LDLC SERPL CALC-MCNC: 78 MG/DL (ref 0–99)
LYMPHOCYTES # BLD AUTO: 2.2 X10E3/UL (ref 0.7–3.1)
LYMPHOCYTES NFR BLD AUTO: 41 %
MCH RBC QN AUTO: 26.2 PG (ref 26.6–33)
MCHC RBC AUTO-ENTMCNC: 32.4 G/DL (ref 31.5–35.7)
MCV RBC AUTO: 81 FL (ref 79–97)
MONOCYTES # BLD AUTO: 0.6 X10E3/UL (ref 0.1–0.9)
MONOCYTES NFR BLD AUTO: 11 %
NEUTROPHILS # BLD AUTO: 2.4 X10E3/UL (ref 1.4–7)
NEUTROPHILS NFR BLD AUTO: 43 %
PLATELET # BLD AUTO: 267 X10E3/UL (ref 150–450)
POTASSIUM SERPL-SCNC: 4.1 MMOL/L (ref 3.5–5.2)
PROT SERPL-MCNC: 6.7 G/DL (ref 6–8.5)
RBC # BLD AUTO: 5.88 X10E6/UL (ref 4.14–5.8)
SODIUM SERPL-SCNC: 144 MMOL/L (ref 134–144)
TRIGL SERPL-MCNC: 114 MG/DL (ref 0–149)
TSH SERPL DL<=0.005 MIU/L-ACNC: 3.79 UIU/ML (ref 0.45–4.5)
VLDLC SERPL CALC-MCNC: 23 MG/DL (ref 5–40)
WBC # BLD AUTO: 5.4 X10E3/UL (ref 3.4–10.8)

## 2019-12-20 ENCOUNTER — OFFICE VISIT (OUTPATIENT)
Dept: URGENT CARE | Age: 39
End: 2019-12-20

## 2019-12-20 VITALS
HEIGHT: 74 IN | OXYGEN SATURATION: 96 % | HEART RATE: 82 BPM | WEIGHT: 306 LBS | RESPIRATION RATE: 16 BRPM | DIASTOLIC BLOOD PRESSURE: 79 MMHG | BODY MASS INDEX: 39.27 KG/M2 | SYSTOLIC BLOOD PRESSURE: 123 MMHG | TEMPERATURE: 98.4 F

## 2019-12-20 DIAGNOSIS — J40 WHEEZY BRONCHITIS: Primary | ICD-10-CM

## 2019-12-20 DIAGNOSIS — H10.30 ACUTE CONJUNCTIVITIS, UNSPECIFIED ACUTE CONJUNCTIVITIS TYPE, UNSPECIFIED LATERALITY: ICD-10-CM

## 2019-12-20 RX ORDER — FLUTICASONE PROPIONATE 50 MCG
2 SPRAY, SUSPENSION (ML) NASAL DAILY
Qty: 1 BOTTLE | Refills: 1 | Status: SHIPPED | OUTPATIENT
Start: 2019-12-20 | End: 2020-01-19

## 2019-12-20 RX ORDER — POLYMYXIN B SULFATE AND TRIMETHOPRIM 1; 10000 MG/ML; [USP'U]/ML
1 SOLUTION OPHTHALMIC
Qty: 1 BOTTLE | Refills: 0 | Status: SHIPPED | OUTPATIENT
Start: 2019-12-20 | End: 2019-12-27

## 2019-12-20 RX ORDER — PREDNISONE 20 MG/1
TABLET ORAL
Qty: 8 TAB | Refills: 0 | Status: SHIPPED | OUTPATIENT
Start: 2019-12-20 | End: 2020-03-18

## 2019-12-20 RX ORDER — AZITHROMYCIN 250 MG/1
TABLET, FILM COATED ORAL
Qty: 6 TAB | Refills: 0 | Status: SHIPPED | OUTPATIENT
Start: 2019-12-20 | End: 2020-03-18

## 2019-12-21 NOTE — PROGRESS NOTES
Here for pink eye and bronchitis. Cough for past week  Dry to productive of green mucus  Has some wheezing. Albuterol with temporary relief. No fever, chills, dizziness or weakness  Also has pink eye. Woke up with eye irritated and goopy. Some improvement during day denies pain or vision change. Past Medical History:   Diagnosis Date    Compression fx, lumbar spine (HCC)     L1 vertebrae    Depression     Fx ankle 04/23/2019    left        Past Surgical History:   Procedure Laterality Date    HX HEENT      Wisdon Teeth         Family History   Problem Relation Age of Onset    Asthma Mother     Migraines Mother     Hypertension Father     Cancer Father    Lord Bougie Bladder Disease Brother         Social History     Socioeconomic History    Marital status: SINGLE     Spouse name: Not on file    Number of children: Not on file    Years of education: Not on file    Highest education level: Not on file   Occupational History    Not on file   Social Needs    Financial resource strain: Not on file    Food insecurity:     Worry: Not on file     Inability: Not on file    Transportation needs:     Medical: Not on file     Non-medical: Not on file   Tobacco Use    Smoking status: Former Smoker    Smokeless tobacco: Never Used   Substance and Sexual Activity    Alcohol use:  Yes    Drug use: No    Sexual activity: Yes     Partners: Female   Lifestyle    Physical activity:     Days per week: Not on file     Minutes per session: Not on file    Stress: Not on file   Relationships    Social connections:     Talks on phone: Not on file     Gets together: Not on file     Attends Christian service: Not on file     Active member of club or organization: Not on file     Attends meetings of clubs or organizations: Not on file     Relationship status: Not on file    Intimate partner violence:     Fear of current or ex partner: Not on file     Emotionally abused: Not on file     Physically abused: Not on file     Forced sexual activity: Not on file   Other Topics Concern    Not on file   Social History Narrative    Not on file                ALLERGIES: Mustard seed    Review of Systems   Eyes: Negative for photophobia and visual disturbance. Gastrointestinal: Negative for nausea and vomiting. Neurological: Negative for light-headedness. All other systems reviewed and are negative. Vitals:    12/20/19 1901   BP: 123/79   Pulse: 82   Resp: 16   Temp: 98.4 °F (36.9 °C)   SpO2: 96%   Weight: 306 lb (138.8 kg)   Height: 6' 2\" (1.88 m)       Physical Exam  Vitals signs and nursing note reviewed. Constitutional:       General: He is not in acute distress. Appearance: Normal appearance. He is not diaphoretic. Comments: Non-toxic appearing, well hydrated   HENT:      Head: Atraumatic. Right Ear: Tympanic membrane normal. There is no impacted cerumen. Left Ear: Tympanic membrane normal. There is no impacted cerumen. Nose: Nose normal. No congestion or rhinorrhea. Mouth/Throat:      Mouth: Mucous membranes are moist.      Pharynx: No oropharyngeal exudate or posterior oropharyngeal erythema. Eyes:      General: No scleral icterus. Extraocular Movements: Extraocular movements intact. Pupils: Pupils are equal, round, and reactive to light. Comments: No eye discharge or erythema noted. EOMS without pain. Neck:      Musculoskeletal: Normal range of motion and neck supple. No neck rigidity. Cardiovascular:      Rate and Rhythm: Normal rate and regular rhythm. Heart sounds: Normal heart sounds. No murmur. No friction rub. No gallop. Pulmonary:      Effort: Pulmonary effort is normal. No respiratory distress. Breath sounds: No stridor. No rhonchi or rales. Comments: Few soft scattered wheeze throughout lung fields bilat. Lymphadenopathy:      Cervical: No cervical adenopathy. Skin:     General: Skin is warm and dry.       Capillary Refill: Capillary refill takes less than 2 seconds. Coloration: Skin is not pale. Findings: No erythema or rash. Neurological:      General: No focal deficit present. Mental Status: He is alert and oriented to person, place, and time. Cranial Nerves: No cranial nerve deficit. Psychiatric:         Mood and Affect: Mood normal.         Behavior: Behavior normal.         Thought Content: Thought content normal.         MDM     Differential Diagnosis; Clinical Impression; Plan:       CLINICAL IMPRESSION:  (J40) Wheezy bronchitis  (primary encounter diagnosis)  (H10.30) Acute conjunctivitis, unspecified acute conjunctivitis type, unspecified laterality    Orders Placed This Encounter      azithromycin (ZITHROMAX) 250 mg tablet          Sig: Take 2 tablets on day 1 then 1 tablet per day for remaining 4 days. Take by mouth. Dispense:  6 Tab          Refill:  0      trimethoprim-polymyxin b (POLYTRIM) ophthalmic solution          Sig: Apply 1 Drop to eye five (5) times daily for 7 days. Dispense:  1 Bottle          Refill:  0      predniSONE (DELTASONE) 20 mg tablet          Sig: Take 2 tabs by mouth one time daily with food for 4 days. Dispense:  8 Tab          Refill:  0      fluticasone propionate (FLONASE) 50 mcg/actuation nasal spray          Si Sprays by Both Nostrils route daily for 30 days. Dispense:  1 Bottle          Refill:  1      Plan:  Bronchitis- short prednisone burst  Continue albuterol  flonase refilled per request  Maintain adequate fluid intake  Start polytrim drops for suspected early pink eye  Warm moist compresses     We have reviewed concerning signs/symptoms, normal vs abnormal progression of medical condition and when to seek immediate medical attention. Schedule with PCP or Urgent Care immediately for worsening or new symptoms.   See your PCP if there is not at least some improvement in symptoms within the next 1 week  You should see your PCP for updates on your routine health maintenance.              Procedures

## 2019-12-21 NOTE — PATIENT INSTRUCTIONS
Follow up with PCP without improvement over next 5-7 days sooner/immediately for new or worsening Bronchitis: Care Instructions Your Care Instructions Bronchitis is inflammation of the bronchial tubes, which carry air to the lungs. The tubes swell and produce mucus, or phlegm. The mucus and inflamed bronchial tubes make you cough. You may have trouble breathing. Most cases of bronchitis are caused by viruses like those that cause colds. Antibiotics usually do not help and they may be harmful. Bronchitis usually develops rapidly and lasts about 2 to 3 weeks in otherwise healthy people. Follow-up care is a key part of your treatment and safety. Be sure to make and go to all appointments, and call your doctor if you are having problems. It's also a good idea to know your test results and keep a list of the medicines you take. How can you care for yourself at home? · Take all medicines exactly as prescribed. Call your doctor if you think you are having a problem with your medicine. · Get some extra rest. 
· Take an over-the-counter pain medicine, such as acetaminophen (Tylenol), ibuprofen (Advil, Motrin), or naproxen (Aleve) to reduce fever and relieve body aches. Read and follow all instructions on the label. · Do not take two or more pain medicines at the same time unless the doctor told you to. Many pain medicines have acetaminophen, which is Tylenol. Too much acetaminophen (Tylenol) can be harmful. · Take an over-the-counter cough medicine that contains dextromethorphan to help quiet a dry, hacking cough so that you can sleep. Avoid cough medicines that have more than one active ingredient. Read and follow all instructions on the label. · Breathe moist air from a humidifier, hot shower, or sink filled with hot water. The heat and moisture will thin mucus so you can cough it out. · Do not smoke. Smoking can make bronchitis worse.  If you need help quitting, talk to your doctor about stop-smoking programs and medicines. These can increase your chances of quitting for good. When should you call for help? Call 911 anytime you think you may need emergency care. For example, call if: 
  · You have severe trouble breathing.  
 Call your doctor now or seek immediate medical care if: 
  · You have new or worse trouble breathing.  
  · You cough up dark brown or bloody mucus (sputum).  
  · You have a new or higher fever.  
  · You have a new rash.  
 Watch closely for changes in your health, and be sure to contact your doctor if: 
  · You cough more deeply or more often, especially if you notice more mucus or a change in the color of your mucus.  
  · You are not getting better as expected. Where can you learn more? Go to http://lupe-liliam.info/. Enter H333 in the search box to learn more about \"Bronchitis: Care Instructions. \" Current as of: June 9, 2019 Content Version: 12.2 © 5775-7835 [employer]. Care instructions adapted under license by [employer] (which disclaims liability or warranty for this information). If you have questions about a medical condition or this instruction, always ask your healthcare professional. [place] [address] any warranty or liability for your use of this information. Pinkeye: Care Instructions Your Care Instructions Pinkeye is redness and swelling of the eye surface and the conjunctiva (the lining of the eyelid and the covering of the white part of the eye). Pinkeye is also called conjunctivitis. Pinkeye is often caused by infection with bacteria or a virus. Dry air, allergies, smoke, and chemicals are other common causes. Pinkeye often clears on its own in 7 to 10 days. Antibiotics only help if the pinkeye is caused by bacteria. Pinkeye caused by infection spreads easily.  If an allergy or chemical is causing pinkeye, it will not go away unless you can avoid whatever is causing it. Follow-up care is a key part of your treatment and safety. Be sure to make and go to all appointments, and call your doctor if you are having problems. It's also a good idea to know your test results and keep a list of the medicines you take. How can you care for yourself at home? · Wash your hands often. Always wash them before and after you treat pinkeye or touch your eyes or face. · Use moist cotton or a clean, wet cloth to remove crust. Wipe from the inside corner of the eye to the outside. Use a clean part of the cloth for each wipe. · Put cold or warm wet cloths on your eye a few times a day if the eye hurts. · Do not wear contact lenses or eye makeup until the pinkeye is gone. Throw away any eye makeup you were using when you got pinkeye. Clean your contacts and storage case. If you wear disposable contacts, use a new pair when your eye has cleared and it is safe to wear contacts again. · If the doctor gave you antibiotic ointment or eyedrops, use them as directed. Use the medicine for as long as instructed, even if your eye starts looking better soon. Keep the bottle tip clean, and do not let it touch the eye area. · To put in eyedrops or ointment: ? Tilt your head back, and pull your lower eyelid down with one finger. ? Drop or squirt the medicine inside the lower lid. ? Close your eye for 30 to 60 seconds to let the drops or ointment move around. ? Do not touch the ointment or dropper tip to your eyelashes or any other surface. · Do not share towels, pillows, or washcloths while you have pinkeye. When should you call for help?  
Call your doctor now or seek immediate medical care if: 
  · You have pain in your eye, not just irritation on the surface.  
  · You have a change in vision or loss of vision.  
  · You have an increase in discharge from the eye.  
  · Your eye has not started to improve or begins to get worse within 48 hours after you start using antibiotics.  
  · Pinkeye lasts longer than 7 days.  
 Watch closely for changes in your health, and be sure to contact your doctor if you have any problems. Where can you learn more? Go to http://lupe-liliam.info/. Enter Y392 in the search box to learn more about \"Pinkeye: Care Instructions. \" Current as of: June 26, 2019 Content Version: 12.2 © 5567-6658 [employer], [employer]. Care instructions adapted under license by [employer] (which disclaims liability or warranty for this information). If you have questions about a medical condition or this instruction, always ask your healthcare professional. [address] any warranty or liability for your use of this information.

## 2020-03-18 ENCOUNTER — OFFICE VISIT (OUTPATIENT)
Dept: URGENT CARE | Age: 40
End: 2020-03-18

## 2020-03-18 ENCOUNTER — HOSPITAL ENCOUNTER (EMERGENCY)
Age: 40
Discharge: HOME | End: 2020-03-18
Attending: EMERGENCY MEDICINE
Payer: COMMERCIAL

## 2020-03-18 ENCOUNTER — APPOINTMENT (OUTPATIENT)
Dept: GENERAL RADIOLOGY | Age: 40
End: 2020-03-18
Attending: PHYSICIAN ASSISTANT
Payer: COMMERCIAL

## 2020-03-18 VITALS
DIASTOLIC BLOOD PRESSURE: 99 MMHG | SYSTOLIC BLOOD PRESSURE: 139 MMHG | HEART RATE: 101 BPM | OXYGEN SATURATION: 96 % | TEMPERATURE: 98.7 F | WEIGHT: 313.27 LBS | HEIGHT: 74 IN | BODY MASS INDEX: 40.2 KG/M2 | RESPIRATION RATE: 15 BRPM

## 2020-03-18 DIAGNOSIS — B34.9 VIRAL ILLNESS: Primary | ICD-10-CM

## 2020-03-18 DIAGNOSIS — J06.9 ACUTE URI: Primary | ICD-10-CM

## 2020-03-18 LAB
FLUAV AG NPH QL IA: NEGATIVE
FLUAV+FLUBV AG NOSE QL IA.RAPID: NEGATIVE POS/NEG
FLUAV+FLUBV AG NOSE QL IA.RAPID: NEGATIVE POS/NEG
FLUBV AG NOSE QL IA: NEGATIVE
VALID INTERNAL CONTROL?: YES

## 2020-03-18 PROCEDURE — 99282 EMERGENCY DEPT VISIT SF MDM: CPT

## 2020-03-18 PROCEDURE — 71046 X-RAY EXAM CHEST 2 VIEWS: CPT

## 2020-03-18 PROCEDURE — 87804 INFLUENZA ASSAY W/OPTIC: CPT

## 2020-03-18 NOTE — ED PROVIDER NOTES
EMERGENCY DEPARTMENT HISTORY AND PHYSICAL EXAM      Date: 3/18/2020  Patient Name: Irma Morley    History of Presenting Illness     Chief Complaint   Patient presents with    Fever     Reports waking up with fever today. [facility]. Was told to quarantine for 14 days but lives with immuno compromised family. History Provided By: Patient    HPI: Irma Morley, 44 y.o. male presents by POV to the ED with cc of URI complaints for the past 4 days. He notes a fever of 100.3. He also has a cough, congestion, ST, and myalgia. Denies ear pain. He has taken Tylenol without relief. He notes that he was seen at an urgent care earlier today where his flu test was negative. Patient notes that he came to the ED for COVID-19 testing due to immunocompromised family members at home. The patient has not had any recent travel or exposure to someone that was COVID-19 positive. He did receive a flu vaccination this season. There are no other complaints, changes, or physical findings at this time. Social Hx: Tobacco (denies), EtOH (denies), Illicit drug use (denies)     PCP: Celina Valderrama,     No current facility-administered medications on file prior to encounter. Current Outpatient Medications on File Prior to Encounter   Medication Sig Dispense Refill    fluticasone propionate (FLONASE NA) by Nasal route.  albuterol (PROVENTIL HFA, VENTOLIN HFA, PROAIR HFA) 90 mcg/actuation inhaler Take 2 Puffs by inhalation.  PSEUDOEPHEDRINE-guaiFENesin (MUCINEX D MAXIMUM STRENGTH) Tb12 extended release tablet Take 1 Tab by mouth daily as needed.  OMEPRAZOLE (PRILOSEC PO) Take  by mouth daily.  albuterol (PROVENTIL HFA, VENTOLIN HFA, PROAIR HFA) 90 mcg/actuation inhaler Take 2 puffs by inhalation every six (6) hours as needed for Wheezing.  1 Inhaler 3    [DISCONTINUED] azithromycin (ZITHROMAX) 250 mg tablet Take 2 tablets on day 1 then 1 tablet per day for remaining 4 days. Take by mouth. 6 Tab 0    [DISCONTINUED] predniSONE (DELTASONE) 20 mg tablet Take 2 tabs by mouth one time daily with food for 4 days. 8 Tab 0    [DISCONTINUED] aspirin-calcium carbonate 81 mg-300 mg calcium(777 mg) tab Take 81 mg by mouth daily as needed. Past History     Past Medical History:  Past Medical History:   Diagnosis Date    Compression fx, lumbar spine ([address]     L1 vertebrae    Depression     Fx ankle 04/23/2019    left       Past Surgical History:  Past Surgical History:   Procedure Laterality Date    HX HEENT      Wisdon Teeth       Family History:  Family History   Problem Relation Age of Onset    Asthma Mother    Jorge [place] Migraines Mother     Hypertension Father     Cancer Father    Erma Cords Bladder Disease Brother        Social History:  Social History     Tobacco Use    Smoking status: Former Smoker    Smokeless tobacco: Never Used   Substance Use Topics    Alcohol use: Yes    Drug use: No       Allergies: Allergies   Allergen Reactions    Mustard Seed Other (comments)     Cotton mouth         Review of Systems   Review of Systems   Constitutional: Positive for chills and fever. Negative for diaphoresis. HENT: Positive for congestion and rhinorrhea. Negative for ear pain and sore throat. Respiratory: Positive for cough. Negative for shortness of breath. Cardiovascular: Negative for chest pain. Gastrointestinal: Negative for abdominal pain, constipation, diarrhea, nausea and vomiting. Genitourinary: Negative for difficulty urinating, dysuria, frequency and hematuria. Musculoskeletal: Negative for arthralgias and myalgias. Neurological: Negative for headaches. All other systems reviewed and are negative. Physical Exam   Physical Exam  Vitals signs and nursing note reviewed. Constitutional:       General: He is not in acute distress. Appearance: He is well-developed. He is not diaphoretic. Comments: 44 y.o.   male    HENT: Head: Normocephalic and atraumatic. Right Ear: Tympanic membrane and ear canal normal. No middle ear effusion. Tympanic membrane is not injected or erythematous. Left Ear: Tympanic membrane and ear canal normal.  No middle ear effusion. Tympanic membrane is not injected or erythematous. Nose: Congestion and rhinorrhea present. Right Sinus: No maxillary sinus tenderness or frontal sinus tenderness. Left Sinus: No maxillary sinus tenderness or frontal sinus tenderness. Mouth/Throat:      Lips: No lesions. Mouth: Mucous membranes are moist.      Pharynx: Oropharynx is clear. Uvula midline. No pharyngeal swelling, oropharyngeal exudate or posterior oropharyngeal erythema. Eyes:      General:         Right eye: No discharge. Left eye: No discharge. Conjunctiva/sclera: Conjunctivae normal.   Neck:      Musculoskeletal: Normal range of motion and neck supple. Cardiovascular:      Rate and Rhythm: Normal rate and regular rhythm. Heart sounds: Normal heart sounds. No murmur. Pulmonary:      Effort: Pulmonary effort is normal. No respiratory distress. Breath sounds: Normal breath sounds. Comments: Non-productive cough. Speaking in clear and complete sentences. Skin:     General: Skin is warm and dry. Neurological:      Mental Status: He is alert and oriented to person, place, and time.    Psychiatric:         Behavior: Behavior normal.         Diagnostic Study Results     Labs -     Recent Results (from the past 12 hour(s))   AMB POC ZOEY INFLUENZA A/B TEST    Collection Time: 03/18/20  5:21 PM   Result Value Ref Range    VALID INTERNAL CONTROL POC Yes     Influenza A Ag POC Negative Negative Pos/Neg    Influenza B Ag POC Negative Negative Pos/Neg   INFLUENZA A+B VIRAL AGS    Collection Time: 03/18/20  7:54 PM   Result Value Ref Range    Influenza A Antigen NEGATIVE  NEG      Influenza B Antigen NEGATIVE  NEG         Radiologic Studies -   CXR Results  (Last 48 hours)               03/18/20 2025  XR CHEST PA LAT Final result    Impression:  IMPRESSION:       Normal chest views. No change. Narrative:  EXAM: XR CHEST PA LAT       INDICATION: Fever today. Negative influenza test.       COMPARISON: Chest views on 2/13/2018       TECHNIQUE: PA and lateral chest views       FINDINGS: The cardiomediastinal and hilar contours are within normal limits. The   pulmonary vasculature is within normal limits. The lungs and pleural spaces are clear. Thoracic spine DISH is increased. The   upper abdomen is age-appropriate. Medical Decision Making   I am the first provider for this patient. I reviewed the vital signs, available nursing notes, past medical history, past surgical history, family history and social history. Vital Signs-Reviewed the patient's vital signs. Patient Vitals for the past 12 hrs:   Temp Pulse Resp BP SpO2   03/18/20 1947 98.7 °F (37.1 °C) (!) 101 15 (!) 139/99 96 %       Records Reviewed: Nursing Notes and Old Medical Records   Records from earlier today reviewed. Provider Notes (Medical Decision Making):   Patient with URI complaints and stable vital signs. No risk factors for COVID-19 to warrant testing. Patient would like repeat influenza testing as he doesn't feel the sample was good at the urgent care. Repeat influenza and chest xray both negative. DDx include bronchitis, PNA, viral URI, seasonal allergies. Will treat symptomatically with OTC medications. ED Course:   Initial assessment performed. The patients presenting problems have been discussed, and they are in agreement with the care plan formulated and outlined with them. I have encouraged them to ask questions as they arise throughout their visit. Critical Care Time: None    Disposition:  DISCHARGE NOTE:  8:44 PM  The pt is ready for discharge.  The pt's signs, symptoms, diagnosis, and discharge instructions have been discussed and pt has conveyed their understanding. The pt is to follow up as recommended or return to ER should their symptoms worsen. Plan has been discussed and pt is in agreement. PLAN:  1. Current Discharge Medication List      CONTINUE these medications which have NOT CHANGED    Details   fluticasone propionate (FLONASE NA) by Nasal route. !! albuterol (PROVENTIL HFA, VENTOLIN HFA, PROAIR HFA) 90 mcg/actuation inhaler Take 2 Puffs by inhalation. PSEUDOEPHEDRINE-guaiFENesin (MUCINEX D MAXIMUM STRENGTH) Tb12 extended release tablet Take 1 Tab by mouth daily as needed. OMEPRAZOLE (PRILOSEC PO) Take  by mouth daily. !! albuterol (PROVENTIL HFA, VENTOLIN HFA, PROAIR HFA) 90 mcg/actuation inhaler Take 2 puffs by inhalation every six (6) hours as needed for Wheezing. Qty: 1 Inhaler, Refills: 3    Associated Diagnoses: Wheeze       !! - Potential duplicate medications found. Please discuss with provider. 2.   Follow-up Information     Follow up With Specialties Details Why Contact Info    Zohaib Schumacher, DO Internal Medicine In 1 week As needed [address] [facility] [address]. [phone]        3. Drink plenty of fluids  4. Take OTC medications for Sx relief. 5. Self quarantine for the next 14 days  Return to ED if worse     Diagnosis     Clinical Impression:   1. Acute URI          Please note that this dictation was completed with OneTwoSee, the computer voice recognition software. Quite often unanticipated grammatical, syntax, homophones, and other interpretive errors are inadvertently transcribed by the computer software. Please disregards these errors. Please excuse any errors that have escaped final proofreading. This note will not be viewable in [address]

## 2020-03-18 NOTE — PROGRESS NOTES
45 yo male here for possible flu  1 day of body aches, nasal congestion, low grade fever and infrequent cough. No preceding illness  Has tried cold meds with temporary relief  Specifically denies covid exposure or recent travel  Overall unchanged. Not worse  There has been no chest pain, trouble breathing or any SOB           Past Medical History:   Diagnosis Date    Compression fx, lumbar spine ([facility])     L1 vertebrae    Depression     Fx ankle 04/23/2019    left        Past Surgical History:   Procedure Laterality Date    HX HEENT      Wisdon Teeth         Family History   Problem Relation Age of Onset    Asthma Mother     Migraines Mother     Hypertension Father     Cancer Father     Gall Bladder Disease Brother         Social History     Socioeconomic History    Marital status: SINGLE     Spouse name: Not on file    Number of children: Not on file    Years of education: Not on file    Highest education level: Not on file   Occupational History    Not on file   Social Needs    Financial resource strain: Not on file    Food insecurity     Worry: Not on file     Inability: Not on file    Transportation needs     Medical: Not on file     Non-medical: Not on file   Tobacco Use    Smoking status: Former Smoker    Smokeless tobacco: Never Used   Substance and Sexual Activity    Alcohol use:  Yes    Drug use: No    Sexual activity: Yes     Partners: Female   Lifestyle    Physical activity     Days per week: Not on file     Minutes per session: Not on file    Stress: Not on file   Relationships    Social connections     Talks on phone: Not on file     Gets together: Not on file     Attends Denominational service: Not on file     Active member of club or organization: Not on file     Attends meetings of clubs or organizations: Not on file     Relationship status: Not on file    Intimate partner violence     Fear of current or ex partner: Not on file     Emotionally abused: Not on file     Physically abused: Not on file     Forced sexual activity: Not on file   Other Topics Concern    Not on file   Social History Narrative    Not on file                ALLERGIES: Mustard seed    Review of Systems   HENT: Negative for trouble swallowing. Respiratory: Negative for cough, choking, chest tightness, shortness of breath and wheezing. Gastrointestinal: Negative for vomiting. Musculoskeletal: Positive for myalgias. Negative for back pain. Skin: Negative for rash. All other systems reviewed and are negative. There were no vitals filed for this visit. Physical Exam  Vitals signs reviewed. Constitutional:       General: He is not in acute distress. Appearance: He is not ill-appearing, toxic-appearing or diaphoretic. HENT:      Nose: Congestion present. No rhinorrhea. Mouth/Throat:      Mouth: Mucous membranes are moist.      Pharynx: No oropharyngeal exudate or posterior oropharyngeal erythema. Eyes:      Extraocular Movements: Extraocular movements intact. Conjunctiva/sclera: Conjunctivae normal.      Pupils: Pupils are equal, round, and reactive to light. Neck:      Musculoskeletal: Normal range of motion and neck supple. No neck rigidity. Cardiovascular:      Rate and Rhythm: Normal rate and regular rhythm. Pulmonary:      Effort: Pulmonary effort is normal. No respiratory distress. Breath sounds: Normal breath sounds. No stridor. No wheezing, rhonchi or rales. Comments: No diminished breath sounds. Lymphadenopathy:      Cervical: No cervical adenopathy. Skin:     Capillary Refill: Capillary refill takes less than 2 seconds. Neurological:      Mental Status: He is alert and oriented to person, place, and time. Psychiatric:         Mood and Affect: Mood normal.         Behavior: Behavior normal.         Thought Content:  Thought content normal.         MDM     Differential Diagnosis; Clinical Impression; Plan:       CLINICAL IMPRESSION:  (B34.9) Viral illness  (primary encounter diagnosis)      Plan:  Rapid flu negative  Lungs clear low suspicion for LRI/pneumonia at this time  Advised self quarantine x 2 weeks given pandemic  SOB, trouble breathing or significant worsening seek care ED/immediatly speak with medical provider on phone to triage. We have reviewed concerning signs/symptoms, normal vs abnormal progression of medical condition and when to seek immediate medical attention. You should see your PCP for updates on your routine health maintenance.              Procedures           Results for orders placed or performed in visit on 03/18/20   AMB POC ZOEY INFLUENZA A/B TEST   Result Value Ref Range    VALID INTERNAL CONTROL POC Yes     Influenza A Ag POC Negative Negative Pos/Neg    Influenza B Ag POC Negative Negative Pos/Neg

## 2020-03-19 ENCOUNTER — PATIENT OUTREACH (OUTPATIENT)
Dept: INTERNAL MEDICINE CLINIC | Age: 40
End: 2020-03-19

## 2020-03-19 NOTE — ED NOTES
Pt discharged by Kirti Carter. Pt provided with discharge instructions Rx and instructions on follow up care. Pt out of ED in stable condition accompanied by self.

## 2020-03-19 NOTE — DISCHARGE INSTRUCTIONS
Patient Education        Upper Respiratory Infection (Cold): Care Instructions  Your Care Instructions    An upper respiratory infection, or URI, is an infection of the nose, sinuses, or throat. URIs are spread by coughs, sneezes, and direct contact. The common cold is the most frequent kind of URI. The flu and sinus infections are other kinds of URIs. Almost all URIs are caused by viruses. Antibiotics won't cure them. But you can treat most infections with home care. This may include drinking lots of fluids and taking over-the-counter pain medicine. You will probably feel better in 4 to 10 days. The doctor has checked you carefully, but problems can develop later. If you notice any problems or new symptoms, get medical treatment right away. Follow-up care is a key part of your treatment and safety. Be sure to make and go to all appointments, and call your doctor if you are having problems. It's also a good idea to know your test results and keep a list of the medicines you take. How can you care for yourself at home? · To prevent dehydration, drink plenty of fluids, enough so that your urine is light yellow or clear like water. Choose water and other caffeine-free clear liquids until you feel better. If you have kidney, heart, or liver disease and have to limit fluids, talk with your doctor before you increase the amount of fluids you drink. · Take an over-the-counter pain medicine, such as acetaminophen (Tylenol), ibuprofen (Advil, Motrin), or naproxen (Aleve). Read and follow all instructions on the label. · Before you use cough and cold medicines, check the label. These medicines may not be safe for young children or for people with certain health problems. · Be careful when taking over-the-counter cold or flu medicines and Tylenol at the same time. Many of these medicines have acetaminophen, which is Tylenol. Read the labels to make sure that you are not taking more than the recommended dose.  Too much acetaminophen (Tylenol) can be harmful. · Get plenty of rest.  · Do not smoke or allow others to smoke around you. If you need help quitting, talk to your doctor about stop-smoking programs and medicines. These can increase your chances of quitting for good. When should you call for help? Call 911 anytime you think you may need emergency care. For example, call if:    · You have severe trouble breathing.    Call your doctor now or seek immediate medical care if:    · You seem to be getting much sicker.     · You have new or worse trouble breathing.     · You have a new or higher fever.     · You have a new rash.    Watch closely for changes in your health, and be sure to contact your doctor if:    · You have a new symptom, such as a sore throat, an earache, or sinus pain.     · You cough more deeply or more often, especially if you notice more mucus or a change in the color of your mucus.     · You do not get better as expected. Where can you learn more? Go to http://lupe-liliam.info/  Enter K520 in the search box to learn more about \"Upper Respiratory Infection (Cold): Care Instructions. \"  Current as of: June 9, 2019Content Version: 12.4  © 9382-3955 [employer], [employer]orporated. Care instructions adapted under license by [employer] (which disclaims liability or warranty for this information). If you have questions about a medical condition or this instruction, always ask your healthcare professional. [address] any warranty or liability for your use of this information.

## 2020-03-20 ENCOUNTER — TELEPHONE (OUTPATIENT)
Dept: PRIMARY CARE CLINIC | Age: 40
End: 2020-03-20

## 2020-03-20 NOTE — TELEPHONE ENCOUNTER
Caller's first and last name: N/A   Reason for call: Pt would like a doctor's note for his employer if possible notating that he is better and has no symptoms of COVID-19 to return to work. Callback required yes/no and why: Yes   Best contact number(s): [phone]   Details to clarify the request: Pt recently had the flu and went to the hospital to be tested for COVID-19 but was dismissed from testing because he did not have the symptoms nor contact with persons with confirmed COVID-19 virus during screening. Pt says he is now feeling better with no symptoms at all and would like to know if he is okay to come off work quarantine.

## 2020-03-20 NOTE — PROGRESS NOTES
03/20/20   Three attempts made to reach patient - voice messages left. Unable to reach patient for hospital f/u. Episode resolved at this time.  AR

## 2020-03-20 NOTE — LETTER
3/23/2020 2:36 PM 
 
Mr. Jessica Browne 
[address] [facility] 
[address] [id number] My patient, Mr. Arredondo Lung, is OK to return to work. Please call my office with any questions. Sincerely, Ancelmo Sampson III, DO

## 2020-03-23 NOTE — TELEPHONE ENCOUNTER
Alison Rosenbaum, DO  You 31 minutes ago (1:58 PM)     If pt no longer has a fever and is feeling ok, then I feel he can go back to work. William Barnes was seen in ED on march 18, tested negative for flu then, and had normal cxr. Routing comment      Called, spoke to pt. Two identifiers confirmed. Notified pt letter will be ready today. Pt verbalized understanding of information discussed w/ no further questions at this time.

## 2020-03-23 NOTE — TELEPHONE ENCOUNTER
Shilpa Olson [address] Office Pool             Pt went to [facility] ER on 3/23/20 for a fever,vomiting and diarrhea and he was test for the flu and it was negative. He does still have a cough. He stated he feels fine and would like a note to go back to work. Pts number is [phone]. He stated the coughing is allergies.      Copy/Paste  ENVERA

## 2020-10-02 ENCOUNTER — TELEPHONE (OUTPATIENT)
Dept: INTERNAL MEDICINE CLINIC | Age: 40
End: 2020-10-02

## 2020-10-02 DIAGNOSIS — Z22.39 CARRIER OF UREAPLASMA UREALYTICUM: Primary | ICD-10-CM

## 2020-10-02 RX ORDER — AZITHROMYCIN 250 MG/1
250 TABLET, FILM COATED ORAL SEE ADMIN INSTRUCTIONS
Qty: 6 TAB | Refills: 0 | Status: SHIPPED | OUTPATIENT
Start: 2020-10-02 | End: 2020-10-07

## 2020-10-02 NOTE — TELEPHONE ENCOUNTER
[id number] [address]    Patient's wife is a Dr Rock Chinese patient. I saw her today by virtual visit. She was started on treatment for ureaplasma by Dr Noemi Roth, urology. She recommended that spouse is treated also. The patient's wife called to request a prescription for him,but could not get through on our phones. I have sent in the zpak for him as per urologist's recommendation.

## 2020-10-26 ENCOUNTER — TELEPHONE (OUTPATIENT)
Dept: INTERNAL MEDICINE CLINIC | Age: 40
End: 2020-10-26

## 2020-10-26 NOTE — TELEPHONE ENCOUNTER
#[phone] Jeannette osborne states that over the weekend pt has gotten congested, sneezing, chest tightness, wheezing, tight cough and a fever. Jeannette is asking for a call to schedule pt as soon as possible.

## 2020-10-26 NOTE — TELEPHONE ENCOUNTER
Romeo Schultz to take pt to uc/er d/t fever. Thomas James verbalized understanding of information discussed w/ no further questions at this time.

## 2022-02-16 ENCOUNTER — OFFICE VISIT (OUTPATIENT)
Dept: URGENT CARE | Age: 42
End: 2022-02-16
Payer: COMMERCIAL

## 2022-02-16 VITALS
WEIGHT: 315 LBS | RESPIRATION RATE: 20 BRPM | HEART RATE: 86 BPM | BODY MASS INDEX: 40.43 KG/M2 | DIASTOLIC BLOOD PRESSURE: 83 MMHG | SYSTOLIC BLOOD PRESSURE: 124 MMHG | OXYGEN SATURATION: 96 % | HEIGHT: 74 IN | TEMPERATURE: 101.2 F

## 2022-02-16 DIAGNOSIS — R50.9 FEVER, UNSPECIFIED FEVER CAUSE: ICD-10-CM

## 2022-02-16 DIAGNOSIS — R68.89 FLU-LIKE SYMPTOMS: Primary | ICD-10-CM

## 2022-02-16 DIAGNOSIS — J98.8 WHEEZING-ASSOCIATED RESPIRATORY INFECTION (WARI): ICD-10-CM

## 2022-02-16 LAB
FLUAV+FLUBV AG NOSE QL IA.RAPID: NEGATIVE
FLUAV+FLUBV AG NOSE QL IA.RAPID: NEGATIVE
S PYO AG THROAT QL: NEGATIVE
SARS-COV-2 PCR, POC: NEGATIVE
VALID INTERNAL CONTROL?: YES
VALID INTERNAL CONTROL?: YES

## 2022-02-16 PROCEDURE — 87635 SARS-COV-2 COVID-19 AMP PRB: CPT | Performed by: NURSE PRACTITIONER

## 2022-02-16 PROCEDURE — 87880 STREP A ASSAY W/OPTIC: CPT | Performed by: NURSE PRACTITIONER

## 2022-02-16 PROCEDURE — 99214 OFFICE O/P EST MOD 30 MIN: CPT | Performed by: NURSE PRACTITIONER

## 2022-02-16 PROCEDURE — 87804 INFLUENZA ASSAY W/OPTIC: CPT | Performed by: NURSE PRACTITIONER

## 2022-02-16 RX ORDER — AMOXICILLIN AND CLAVULANATE POTASSIUM 875; 125 MG/1; MG/1
1 TABLET, FILM COATED ORAL 2 TIMES DAILY
Qty: 14 TABLET | Refills: 0 | Status: SHIPPED | OUTPATIENT
Start: 2022-02-16 | End: 2022-02-23

## 2022-02-16 RX ORDER — PREDNISONE 20 MG/1
TABLET ORAL
Qty: 8 TABLET | Refills: 0 | Status: SHIPPED | OUTPATIENT
Start: 2022-02-16

## 2022-02-16 NOTE — PROGRESS NOTES
Subjective: (As above and below)     The patient/guardian gave verbal consent to treat. Chief Complaint   Patient presents with    Other     sinus pressure/congestion, swollen tonsils, no ear pain, fever. home test were negative. no exposure to covid. Samaria Love is a 39 y.o. male who presents for evaluation of : nasal congestion, sinus pressure, sore throat, cough. Symptom onset 3 days ago . Cough dry, tight with occasional wheeze and mild SOB. denies labored breathing or SOB at rest currently. Hx of asthma/asthmatic bronchitis. Preceding illness: none. No other identified aggravating or alleviating factors. Symptoms are constant and overall unchanged. Denies vomiting, severe SOB, rashes    Known Exposure to COVID-19: no known. Took home covid 19 test that was negative      ROS  Review of Systems - negative except as listed above    Reviewed PmHx, RxHx, FmHx, SocHx, AllgHx and updated in chart. Family History   Problem Relation Age of Onset    Asthma Mother    [facility] Migraines Mother     Hypertension Father     Cancer Father    Francis Fabian Bladder Disease Brother        Past Medical History:   Diagnosis Date    Compression fx, lumbar spine ([address]     L1 vertebrae    Depression     Fx ankle 04/23/2019    left      Social History     Socioeconomic History    Marital status: SINGLE   Tobacco Use    Smoking status: Former Smoker    Smokeless tobacco: Never Used   Substance and Sexual Activity    Alcohol use: Yes    Drug use: No    Sexual activity: Yes     Partners: Female          Current Outpatient Medications   Medication Sig    predniSONE (DELTASONE) 20 mg tablet Take 2 tabs by mouth one time daily with food for 4 days.  amoxicillin-clavulanate (Augmentin) 875-125 mg per tablet Take 1 Tablet by mouth two (2) times a day for 7 days.  fluticasone propionate (FLONASE NA) by Nasal route.     albuterol (PROVENTIL HFA, VENTOLIN HFA, PROAIR HFA) 90 mcg/actuation inhaler Take 2 Puffs by inhalation.  OMEPRAZOLE (PRILOSEC PO) Take  by mouth daily.  albuterol (PROVENTIL HFA, VENTOLIN HFA, PROAIR HFA) 90 mcg/actuation inhaler Take 2 puffs by inhalation every six (6) hours as needed for Wheezing. No current facility-administered medications for this visit. Objective:     Vitals:    02/16/22 1440   BP: 124/83   Pulse: 86   Resp: 20   Temp: (!) 101.2 °F (38.4 °C)   SpO2: 96%   Weight: 319 lb 8 oz (144.9 kg)   Height: 6' 2\" (1.88 m)       Physical Exam  General appearance  appears well hydrated and does not appear toxic, no acute distress  Eyes - EOMs intact. Non injected. No scleral icterus   Ears - no external swelling  Nose - nasal congestion. No purulent drainage  Mouth - OP clear without swelling, exudate or lesion. Mucus membranes moist. Uvula midline. Neck/Lymphatics  trachea midline, full AROM  Chest - Normal breathing effort no wheeze rales, rhonchi or diminishments bilaterally. Heart - RRR, no murmurs  Skin - no observable rashes or pallor  Neurologic- alert and oriented x 3  Psychiatric- normal mood, behavior and though content. Assessment/ Plan:     1. Flu-like symptoms    - POCT COVID-19, SARS-COV-2, PCR  - AMB POC RAPID STREP A  - AMB POC ZOEY INFLUENZA A/B TEST    2. Fever, unspecified fever cause      Covid 19 test result today is NEGATIVE  Rapid Flu and strep test are also negative  ddx viral URI, early LRI, bacterial sinusitis  Will empirically treat with Augmentin given sinus pain with fever  Continue albuterol at home as needed; short burst of prednisone given hx of asthma with cough that likely represents bronchospasm  Lungs clear on exam today. If not improving may consider CXR/re evaluation.   Supportive home care for mild symptoms advised- maintain adequate fluid intake, over the counter Tylenol (for fever, aches, pains, chills), deep breathing exercises  Return 4 days if still having fevers or havent made any improvement  Immediate evaluation/ED eval for new, worsening or changes      Test Results:  Recent Results (from the past 6 hour(s))   POCT COVID-19, SARS-COV-2, PCR    Collection Time: 02/16/22  3:10 PM   Result Value Ref Range    SARS-COV-2 PCR, POC Negative Negative   AMB POC ZOEY INFLUENZA A/B TEST    Collection Time: 02/16/22  3:44 PM   Result Value Ref Range    VALID INTERNAL CONTROL POC Yes     Influenza A Ag POC Negative Negative    Influenza B Ag POC Negative Negative   AMB POC RAPID STREP A    Collection Time: 02/16/22  3:47 PM   Result Value Ref Range    VALID INTERNAL CONTROL POC Yes     Group A Strep Ag Negative Negative       Follow up: We have reviewed worsening/concerning signs and symptoms that may warrant seeking immediate care in the ED setting. For other non-severe changes, non-improvement or questions, patient aware to contact PCP office or consider a virtual online medical consultation.         Rosalinda Ricci NP

## 2022-03-19 PROBLEM — E66.01 OBESITY, MORBID (HCC): Status: ACTIVE | Noted: 2018-06-11

## 2022-03-20 PROBLEM — G47.33 OSA (OBSTRUCTIVE SLEEP APNEA): Status: ACTIVE | Noted: 2018-07-06

## 2023-05-25 RX ORDER — ALBUTEROL SULFATE 90 UG/1
2 AEROSOL, METERED RESPIRATORY (INHALATION)
COMMUNITY
Start: 2014-11-25

## 2023-05-25 RX ORDER — PREDNISONE 20 MG/1
TABLET ORAL
COMMUNITY
Start: 2022-02-16

## 2024-08-16 ENCOUNTER — TELEPHONE (OUTPATIENT)
Age: 44
End: 2024-08-16

## 2024-08-16 NOTE — TELEPHONE ENCOUNTER
Spoke w/ Sonia, explained that pt has not been seen since 2019, pt is now a new pt per office policy. It was understood and the have scheduled a re-est VV NP appt w/ Dr. Galvan for 11/1. Sonia was informed that per clinical team, pt should go to . It was understood and no questions at this time.

## 2024-08-16 NOTE — TELEPHONE ENCOUNTER
Patient's wife, Sonia states she needs to get a Lab Order for Arsenic Poisoning for the patient. Please call to discuss & advise Plan of Care. Thank you

## 2024-10-24 ENCOUNTER — TRANSCRIBE ORDERS (OUTPATIENT)
Facility: HOSPITAL | Age: 44
End: 2024-10-24

## 2024-10-24 ENCOUNTER — HOSPITAL ENCOUNTER (OUTPATIENT)
Facility: HOSPITAL | Age: 44
Discharge: HOME | End: 2024-10-27
Payer: COMMERCIAL

## 2024-10-24 DIAGNOSIS — J45.40 MODERATE PERSISTENT ASTHMA WITHOUT COMPLICATION: Primary | ICD-10-CM

## 2024-10-24 DIAGNOSIS — J45.40 MODERATE PERSISTENT ASTHMA WITHOUT COMPLICATION: ICD-10-CM

## 2024-10-24 PROCEDURE — 71046 X-RAY EXAM CHEST 2 VIEWS: CPT

## 2024-11-08 ENCOUNTER — TELEMEDICINE (OUTPATIENT)
Age: 44
End: 2024-11-08
Payer: COMMERCIAL

## 2024-11-08 DIAGNOSIS — K21.9 GASTROESOPHAGEAL REFLUX DISEASE, UNSPECIFIED WHETHER ESOPHAGITIS PRESENT: ICD-10-CM

## 2024-11-08 DIAGNOSIS — J45.40 MODERATE PERSISTENT ASTHMA WITHOUT COMPLICATION: Primary | ICD-10-CM

## 2024-11-08 DIAGNOSIS — G44.209 TENSION HEADACHE: ICD-10-CM

## 2024-11-08 PROCEDURE — 99203 OFFICE O/P NEW LOW 30 MIN: CPT | Performed by: INTERNAL MEDICINE

## 2024-11-08 RX ORDER — FLUTICASONE FUROATE, UMECLIDINIUM BROMIDE AND VILANTEROL TRIFENATATE 100; 62.5; 25 UG/1; UG/1; UG/1
POWDER RESPIRATORY (INHALATION)
COMMUNITY
Start: 2024-10-28

## 2024-11-08 SDOH — ECONOMIC STABILITY: FOOD INSECURITY: WITHIN THE PAST 12 MONTHS, THE FOOD YOU BOUGHT JUST DIDN'T LAST AND YOU DIDN'T HAVE MONEY TO GET MORE.: NEVER TRUE

## 2024-11-08 SDOH — ECONOMIC STABILITY: FOOD INSECURITY: WITHIN THE PAST 12 MONTHS, YOU WORRIED THAT YOUR FOOD WOULD RUN OUT BEFORE YOU GOT MONEY TO BUY MORE.: NEVER TRUE

## 2024-11-08 SDOH — ECONOMIC STABILITY: INCOME INSECURITY: HOW HARD IS IT FOR YOU TO PAY FOR THE VERY BASICS LIKE FOOD, HOUSING, MEDICAL CARE, AND HEATING?: NOT HARD AT ALL

## 2024-11-08 ASSESSMENT — PATIENT HEALTH QUESTIONNAIRE - PHQ9
SUM OF ALL RESPONSES TO PHQ QUESTIONS 1-9: 0
5. POOR APPETITE OR OVEREATING: NOT AT ALL
4. FEELING TIRED OR HAVING LITTLE ENERGY: NOT AT ALL
8. MOVING OR SPEAKING SO SLOWLY THAT OTHER PEOPLE COULD HAVE NOTICED. OR THE OPPOSITE, BEING SO FIGETY OR RESTLESS THAT YOU HAVE BEEN MOVING AROUND A LOT MORE THAN USUAL: NOT AT ALL
2. FEELING DOWN, DEPRESSED OR HOPELESS: NOT AT ALL
SUM OF ALL RESPONSES TO PHQ9 QUESTIONS 1 & 2: 0
1. LITTLE INTEREST OR PLEASURE IN DOING THINGS: NOT AT ALL
SUM OF ALL RESPONSES TO PHQ QUESTIONS 1-9: 0
3. TROUBLE FALLING OR STAYING ASLEEP: NOT AT ALL
9. THOUGHTS THAT YOU WOULD BE BETTER OFF DEAD, OR OF HURTING YOURSELF: NOT AT ALL
6. FEELING BAD ABOUT YOURSELF - OR THAT YOU ARE A FAILURE OR HAVE LET YOURSELF OR YOUR FAMILY DOWN: NOT AT ALL
SUM OF ALL RESPONSES TO PHQ QUESTIONS 1-9: 0
7. TROUBLE CONCENTRATING ON THINGS, SUCH AS READING THE NEWSPAPER OR WATCHING TELEVISION: NOT AT ALL
10. IF YOU CHECKED OFF ANY PROBLEMS, HOW DIFFICULT HAVE THESE PROBLEMS MADE IT FOR YOU TO DO YOUR WORK, TAKE CARE OF THINGS AT HOME, OR GET ALONG WITH OTHER PEOPLE: NOT DIFFICULT AT ALL
SUM OF ALL RESPONSES TO PHQ QUESTIONS 1-9: 0

## 2024-11-08 NOTE — PROGRESS NOTES
\"Have you been to the ER, urgent care clinic since your last visit?  Hospitalized since your last visit?\"    New Pt  “Have you seen or consulted any other health care providers outside our system since your last visit?”    Dr humberto [address]           
in the state where the patient is located as indicated above. If you are not or unsure, please re-schedule the visit: Yes, I confirm.        Total time spent for this encounter: Not billed by time    --Ki Galvan MD on 11/8/2024 at 3:26 PM    An electronic signature was used to authenticate this note.      Ki Galvan MD